# Patient Record
Sex: MALE | Race: WHITE | NOT HISPANIC OR LATINO | Employment: OTHER | URBAN - METROPOLITAN AREA
[De-identification: names, ages, dates, MRNs, and addresses within clinical notes are randomized per-mention and may not be internally consistent; named-entity substitution may affect disease eponyms.]

---

## 2019-11-27 ENCOUNTER — HOSPITAL ENCOUNTER (EMERGENCY)
Facility: CLINIC | Age: 39
Discharge: HOME | End: 2019-11-27
Attending: EMERGENCY MEDICINE | Admitting: EMERGENCY MEDICINE
Payer: COMMERCIAL

## 2019-11-27 VITALS
OXYGEN SATURATION: 96 % | TEMPERATURE: 98 F | BODY MASS INDEX: 31.5 KG/M2 | RESPIRATION RATE: 16 BRPM | WEIGHT: 220 LBS | DIASTOLIC BLOOD PRESSURE: 87 MMHG | SYSTOLIC BLOOD PRESSURE: 134 MMHG | HEART RATE: 102 BPM | HEIGHT: 70 IN

## 2019-11-27 DIAGNOSIS — J02.0 STREPTOCOCCAL PHARYNGITIS: ICD-10-CM

## 2019-11-27 LAB
DEPRECATED S PYO AG THROAT QL EIA: ABNORMAL
SPECIMEN SOURCE: ABNORMAL

## 2019-11-27 PROCEDURE — 25000132 ZZH RX MED GY IP 250 OP 250 PS 637: Performed by: EMERGENCY MEDICINE

## 2019-11-27 PROCEDURE — 25000125 ZZHC RX 250: Performed by: EMERGENCY MEDICINE

## 2019-11-27 PROCEDURE — 87880 STREP A ASSAY W/OPTIC: CPT | Performed by: EMERGENCY MEDICINE

## 2019-11-27 PROCEDURE — 25000128 H RX IP 250 OP 636: Performed by: EMERGENCY MEDICINE

## 2019-11-27 PROCEDURE — 99284 EMERGENCY DEPT VISIT MOD MDM: CPT

## 2019-11-27 RX ORDER — DEXAMETHASONE SODIUM PHOSPHATE 4 MG/ML
10 VIAL (ML) INJECTION ONCE
Status: COMPLETED | OUTPATIENT
Start: 2019-11-27 | End: 2019-11-27

## 2019-11-27 RX ORDER — ACETAMINOPHEN 325 MG/1
650 TABLET ORAL ONCE
Status: COMPLETED | OUTPATIENT
Start: 2019-11-27 | End: 2019-11-27

## 2019-11-27 RX ADMIN — PENICILLIN G BENZATHINE 1.2 MILLION UNITS: 1200000 INJECTION, SUSPENSION INTRAMUSCULAR at 20:49

## 2019-11-27 RX ADMIN — DEXAMETHASONE SODIUM PHOSPHATE 10 MG: 4 INJECTION, SOLUTION INTRAMUSCULAR; INTRAVENOUS at 19:55

## 2019-11-27 RX ADMIN — ACETAMINOPHEN 650 MG: 325 TABLET, FILM COATED ORAL at 19:56

## 2019-11-27 ASSESSMENT — ENCOUNTER SYMPTOMS
CHILLS: 1
FEVER: 1
MYALGIAS: 1
TROUBLE SWALLOWING: 1
DIAPHORESIS: 1
SORE THROAT: 1
COUGH: 0
VOMITING: 0
DIARRHEA: 1

## 2019-11-27 ASSESSMENT — MIFFLIN-ST. JEOR: SCORE: 1919.16

## 2019-11-27 NOTE — ED AVS SNAPSHOT
[facility] Emergency Department  [address]  [facility] [address]  Phone:  [phone]  Fax:  [phone]                                    Gualberto Lehman   MRN: [id number]    Department:  [facility] Emergency Department   Date of Visit:  11/27/2019           After Visit Summary Signature Page    I have received my discharge instructions, and my questions have been answered. I have discussed any challenges I see with this plan with the nurse or doctor.    ..........................................................................................................................................  Patient/Patient Representative Signature      ..........................................................................................................................................  Patient Representative Print Name and Relationship to Patient    ..................................................               ................................................  Date                                   Time    ..........................................................................................................................................  Reviewed by Signature/Title    ...................................................              ..............................................  Date                                               Time          22EPIC Rev 08/18

## 2019-11-28 NOTE — ED PROVIDER NOTES
"  History     Chief Complaint:  Pharyngitis    The history is provided by the patient.      Gualberto Lehman is a 39 year old male, with history of thyroid disease, who presents alone for evaluation of a sore throat and associated fever as high as 102F, chills and diaphoresis for the last two days. Patient has been taking acetaminophen and ibuprofen for his symptoms, last dose at 1600 today. However, per patient, wife noticed white spots to the right side of his throat, prompting him to present.     Here, patient reports he returned from [place] three days ago and initially thought his symptoms were due to exhaustion. Patient also endorses some diarrhea, generalized malaise, right ear pain and pain with swallowing secondary to the pain, but denies any cough or vomiting. He also states he has been taking Thera-Flu as well.     Allergies:  No Known Drug Allergies      Medications:    Synthroid    Past Medical History:    Vitiligo  Hypothyroidism    Past Surgical History:    Excision of tonsil tags  Tonsillectomy    Family History:    Mother - asthma  Brother - asthma    Social History:  The patient was accompanied to the ED alone.  Smoking Status: No  Alcohol Use: Yes  Drug Use: No   Marital Status:       Review of Systems   Constitutional: Positive for chills, diaphoresis and fever.   HENT: Positive for ear pain, sore throat and trouble swallowing.    Respiratory: Negative for cough.    Gastrointestinal: Positive for diarrhea. Negative for vomiting.   Musculoskeletal: Positive for myalgias (generalized).   All other systems reviewed and are negative.    Physical Exam     Patient Vitals for the past 24 hrs:   BP Temp Temp src Pulse Heart Rate Resp SpO2 Height Weight   11/27/19 1909 (!) 155/99 98  F (36.7  C) Oral 110 110 18 99 % 1.778 m (5' 10\") 99.8 kg (220 lb)       Physical Exam  VS: Reviewed per above  HENT: Mucous membranes moist. Uvula midline, no tonsillar hypertrophy nor asymmetry.  There are a " few posterior oropharyngeal exudates.  Tolerating secretions, normal phonation. No nuchal rigidity.  EYES: sclera anicteric  CV: Rate as noted, regular rhythm.   RESP: Effort normal. Breath sounds are normal bilaterally.  GI: no tenderness/rebound/guarding, not distended.  NEURO: Alert, moving all extremities  MSK: No deformity of the extremities  SKIN: Warm and dry    Emergency Department Course     Laboratory:  Laboratory findings were communicated with the patient who voiced understanding of the findings.    Rapid Strep Test: Positive (A)    Interventions:  1955 Decadron 10 mg PO  1956 Tylenol 650 mg PO  2049 Bicillin 1.2 million units IM    Emergency Department Course:  Past medical records, nursing notes, and vitals reviewed.    (1944)   I performed an exam of the patient as documented above. History obtained from patient.     A throat swab was obtained for laboratory testing, findings above.      (2023)   I rechecked the patient and discussed the results of his workup thus far. Plan of care discussed and patient will be discharged.     Findings and plan explained to the Patient. Patient discharged home with instructions regarding supportive care, medications, and reasons to return. The importance of close follow-up was reviewed. I personally reviewed the laboratory results with the Patient and answered all related questions prior to discharge.     Impression & Plan     Medical Decision Making:    Gualberto Lehman is a 39 year old male, with history of thyroid disease, who presents today for a sore throat with associated fever, chills and generalized malaise. Details as noted in the HPI.  On arrival he is afebrile.  Due to continued symptoms, despite acetaminophen and ibuprofen, he was prompted to present. He also reports that his wife noticed white spots at that back of his throat on the right side. There is clinical evidence of pharyngitis.  The rapid strep test is positive. There is no clinical evidence  of meningitis, peritonsillar abscess, retropharyngeal abscess, epiglottis, or Vinayak's angina. The patient's symptoms are consistent with streptococcal pharyngitis.  I have recommended treatment with antibiotics and patient received oral Decadron and IM Bicillin here in the ED. Discussed treatment with analgesics as well.  The patient was told to return if increasing pain, change in voice, neck pain, vomiting, fever, or shortness of breath. Follow-up with primary physician if not improving in 3-5 days. All questions were answered prior to the patient's discharge. He was in agreement with the plan stated above.       Discharge Diagnosis:    ICD-10-CM    1. Streptococcal pharyngitis J02.0        Disposition:  Discharged to home.    Scribe Disclosure:  I, Akua Reed, am serving as a scribe at 7:29 PM on 11/27/2019 to document services personally performed by Bao Santiago MD based on my observations and the provider's statements to me.    11/27/2019   [facility] EMERGENCY DEPARTMENT       Bao Santiago MD  11/27/19 9999

## 2019-11-28 NOTE — ED TRIAGE NOTES
Here for sore throat, fever, and chills for last couple days. Per patient per spouse, white spots to right side of throat. Painful to swallow. Taking aspirin and ibuprofen, last ibuprofen at 4pm. ABCs intact.

## 2022-08-02 ENCOUNTER — APPOINTMENT (OUTPATIENT)
Dept: CT IMAGING | Facility: CLINIC | Age: 42
End: 2022-08-02
Attending: EMERGENCY MEDICINE

## 2022-08-02 ENCOUNTER — HOSPITAL ENCOUNTER (EMERGENCY)
Facility: CLINIC | Age: 42
Discharge: HOME | End: 2022-08-02
Attending: EMERGENCY MEDICINE | Admitting: EMERGENCY MEDICINE

## 2022-08-02 VITALS
WEIGHT: 200 LBS | OXYGEN SATURATION: 97 % | DIASTOLIC BLOOD PRESSURE: 92 MMHG | BODY MASS INDEX: 28.63 KG/M2 | RESPIRATION RATE: 16 BRPM | HEIGHT: 70 IN | SYSTOLIC BLOOD PRESSURE: 130 MMHG | TEMPERATURE: 98 F | HEART RATE: 98 BPM

## 2022-08-02 DIAGNOSIS — K52.9 GASTROENTERITIS: ICD-10-CM

## 2022-08-02 LAB
ALBUMIN SERPL-MCNC: 4.1 G/DL (ref 3.5–5)
ALP SERPL-CCNC: 103 U/L (ref 45–120)
ALT SERPL W P-5'-P-CCNC: 31 U/L (ref 0–45)
ANION GAP SERPL CALCULATED.3IONS-SCNC: 14 MMOL/L (ref 5–18)
AST SERPL W P-5'-P-CCNC: 32 U/L (ref 0–40)
BILIRUB DIRECT SERPL-MCNC: 0.1 MG/DL
BILIRUB SERPL-MCNC: 0.5 MG/DL (ref 0–1)
BUN SERPL-MCNC: 19 MG/DL (ref 8–22)
CALCIUM SERPL-MCNC: 9.8 MG/DL (ref 8.5–10.5)
CHLORIDE BLD-SCNC: 103 MMOL/L (ref 98–107)
CO2 SERPL-SCNC: 22 MMOL/L (ref 22–31)
CREAT SERPL-MCNC: 1.21 MG/DL (ref 0.7–1.3)
ERYTHROCYTE [DISTWIDTH] IN BLOOD BY AUTOMATED COUNT: 13.2 % (ref 10–15)
GFR SERPL CREATININE-BSD FRML MDRD: 77 ML/MIN/1.73M2
GLUCOSE BLD-MCNC: 94 MG/DL (ref 70–125)
HCT VFR BLD AUTO: 46.2 % (ref 40–53)
HGB BLD-MCNC: 15.5 G/DL (ref 13.3–17.7)
LIPASE SERPL-CCNC: 32 U/L (ref 0–52)
MCH RBC QN AUTO: 29.1 PG (ref 26.5–33)
MCHC RBC AUTO-ENTMCNC: 33.5 G/DL (ref 31.5–36.5)
MCV RBC AUTO: 87 FL (ref 78–100)
PLATELET # BLD AUTO: 284 10E3/UL (ref 150–450)
POTASSIUM BLD-SCNC: 4.1 MMOL/L (ref 3.5–5)
PROT SERPL-MCNC: 7.9 G/DL (ref 6–8)
RBC # BLD AUTO: 5.33 10E6/UL (ref 4.4–5.9)
SODIUM SERPL-SCNC: 139 MMOL/L (ref 136–145)
TROPONIN I SERPL-MCNC: <0.01 NG/ML (ref 0–0.29)
WBC # BLD AUTO: 7.6 10E3/UL (ref 4–11)

## 2022-08-02 PROCEDURE — 250N000011 HC RX IP 250 OP 636: Performed by: EMERGENCY MEDICINE

## 2022-08-02 PROCEDURE — 74177 CT ABD & PELVIS W/CONTRAST: CPT

## 2022-08-02 PROCEDURE — 80053 COMPREHEN METABOLIC PANEL: CPT | Performed by: EMERGENCY MEDICINE

## 2022-08-02 PROCEDURE — 85027 COMPLETE CBC AUTOMATED: CPT | Performed by: EMERGENCY MEDICINE

## 2022-08-02 PROCEDURE — 82248 BILIRUBIN DIRECT: CPT | Performed by: EMERGENCY MEDICINE

## 2022-08-02 PROCEDURE — 99285 EMERGENCY DEPT VISIT HI MDM: CPT | Mod: 25

## 2022-08-02 PROCEDURE — 36415 COLL VENOUS BLD VENIPUNCTURE: CPT | Performed by: EMERGENCY MEDICINE

## 2022-08-02 PROCEDURE — 83690 ASSAY OF LIPASE: CPT | Performed by: EMERGENCY MEDICINE

## 2022-08-02 PROCEDURE — 84484 ASSAY OF TROPONIN QUANT: CPT | Performed by: EMERGENCY MEDICINE

## 2022-08-02 RX ORDER — DICYCLOMINE HCL 20 MG
20 TABLET ORAL 2 TIMES DAILY
Qty: 20 TABLET | Refills: 0 | Status: SHIPPED | OUTPATIENT
Start: 2022-08-02 | End: 2022-08-12

## 2022-08-02 RX ORDER — IOPAMIDOL 755 MG/ML
100 INJECTION, SOLUTION INTRAVASCULAR ONCE
Status: COMPLETED | OUTPATIENT
Start: 2022-08-02 | End: 2022-08-02

## 2022-08-02 RX ADMIN — IOPAMIDOL 100 ML: 755 INJECTION, SOLUTION INTRAVENOUS at 22:34

## 2022-08-03 NOTE — ED TRIAGE NOTES
Pt presents to the ED with c/o lower abdominal pain and cramping for the past 3-4 days along with diarrhea. Pt denies fevers.

## 2022-08-03 NOTE — ED PROVIDER NOTES
"EMERGENCY DEPARTMENT ENCOUnter      NAME: Gualberto Lehman  AGE: 41 year old male  YOB: 1980  MRN: [id number]  EVALUATION DATE & TIME: No admission date for patient encounter.    PCP: Munod Menchaca [place]    ED PROVIDER: Gómez Cordova DO      Chief Complaint   Patient presents with     Abdominal Pain     Diarrhea         FINAL IMPRESSION:  1. Gastroenteritis          ED COURSE & MEDICAL DECISION MAKIN:40 PM I met the patient and performed my initial interview and exam in waiting room.  11:44 PM Spoke about plan for discharge.         The patient presented to the emergency department today with complaints of abdominal pain and diarrhea.  His pain has been present for the past few days.  No concerning physical exam findings.  Laboratory testing is unremarkable and CT reveals evidence of likely gastroenteritis.  Plan will be for discharge home with outpatient follow-up.  He is comfortable with this plan.      At the conclusion of the encounter I discussed the results of all of the tests and the disposition. The questions were answered. The patient or family acknowledged understanding and was agreeable with the care plan.         MEDICATIONS GIVEN IN THE EMERGENCY:  Medications   iopamidol (ISOVUE-370) solution 100 mL (100 mLs Intravenous Given 22)       NEW PRESCRIPTIONS STARTED AT TODAY'S ER VISIT  New Prescriptions    DICYCLOMINE (BENTYL) 20 MG TABLET    Take 1 tablet (20 mg) by mouth 2 times daily for 10 days          =================================================================    HPI        Gualberto Lehman is a 41 year old male with a pertinent history of IBS who presents to this ED by walk in for evaluation of abdominal pain and diarrhea.    Patient reports that three days ago after eating he had lower abdominal pain with diarrhea. He has continued to have this on and off for the past two nights that is painful near his waistline that feels like \"wringing pain\". " "When he pushes on his abdomen he has discomfort. Has tried ammonium, tums, and pepcid without relief. He denies any other complaints at this time.       REVIEW OF SYSTEMS     Constitutional:  Denies fever or chills  HENT:  Denies sore throat   Respiratory:  Denies cough or shortness of breath   Cardiovascular:  Denies chest pain or palpitations  GI: Positive for abdominal pain and diarrhea. Denies nausea, or vomiting  Musculoskeletal:  Denies any new extremity pain   Skin:  Denies rash   Neurologic:  Denies headache, focal weakness or sensory changes    All other systems reviewed and are negative      PAST MEDICAL HISTORY:  Past Medical History:   Diagnosis Date     Family history of asthma     only as infant personally     Vitiligo     hands and genitals  since age 18       PAST SURGICAL HISTORY:  Past Surgical History:   Procedure Laterality Date     [facility] EXCISION OF TONSIL TAGS      age 13           CURRENT MEDICATIONS:    dicyclomine (BENTYL) 20 MG tablet  NO ACTIVE MEDICATIONS        ALLERGIES:  Allergies   Allergen Reactions     No Known Allergies        FAMILY HISTORY:  Family History   Problem Relation Age of Onset     Respiratory Mother         asthma     Respiratory Brother         asthma       SOCIAL HISTORY:   Social History     Socioeconomic History     Marital status: Single     Spouse name: None     Number of children: None     Years of education: None     Highest education level: None   Tobacco Use     Smoking status: Never Smoker     Smokeless tobacco: Never Used   Substance and Sexual Activity     Alcohol use: Yes     Comment: sometimes     Drug use: No       VITALS:  Patient Vitals for the past 24 hrs:   BP Temp Temp src Pulse Resp SpO2 Height Weight   08/02/22 2018 (!) 150/98 98  F (36.7  C) Temporal 98 18 97 % 1.778 m (5' 10\") 90.7 kg (200 lb)       PHYSICAL EXAM    VITAL SIGNS: BP (!) 150/98   Pulse 98   Temp 98  F (36.7  C) (Temporal)   Resp 18   Ht 1.778 m (5' 10\")   Wt 90.7 kg (200 lb)  "  SpO2 97%   BMI 28.70 kg/m     Constitutional:  Well developed, Well nourished,  HENT:  Normocephalic, Atraumatic, Oropharynx moist, Nose normal.   Neck:  Normal range of motion, Supple, No stridor.   Eyes:  EOMI, Conjunctiva normal, No discharge.   Respiratory:  Breathing comfortably, No respiratory distress,    Cardiovascular:  Normal pulses   Musculoskeletal:  No edema or cyanosis, no deformities  Integument:  Dry, No erythema, No rash.  Neurologic:  Alert & oriented x 3, No obvious focal deficits noted.   Psychiatric:  Affect normal, Judgment normal, Mood normal.     LAB:  All pertinent labs reviewed and interpreted.  Results for orders placed or performed during the hospital encounter of 08/02/22              CBC with platelets   Result Value Ref Range    WBC Count 7.6 4.0 - 11.0 10e3/uL    RBC Count 5.33 4.40 - 5.90 10e6/uL    Hemoglobin 15.5 13.3 - 17.7 g/dL    Hematocrit 46.2 40.0 - 53.0 %    MCV 87 78 - 100 fL    MCH 29.1 26.5 - 33.0 pg    MCHC 33.5 31.5 - 36.5 g/dL    RDW 13.2 10.0 - 15.0 %    Platelet Count 284 150 - 450 10e3/uL   Basic metabolic panel   Result Value Ref Range    Sodium 139 136 - 145 mmol/L    Potassium 4.1 3.5 - 5.0 mmol/L    Chloride 103 98 - 107 mmol/L    Carbon Dioxide (CO2) 22 22 - 31 mmol/L    Anion Gap 14 5 - 18 mmol/L    Urea Nitrogen 19 8 - 22 mg/dL    Creatinine 1.21 0.70 - 1.30 mg/dL    Calcium 9.8 8.5 - 10.5 mg/dL    Glucose 94 70 - 125 mg/dL    GFR Estimate 77 >60 mL/min/1.73m2   Hepatic function panel   Result Value Ref Range    Bilirubin Total 0.5 0.0 - 1.0 mg/dL    Bilirubin Direct 0.1 <=0.5 mg/dL    Protein Total 7.9 6.0 - 8.0 g/dL    Albumin 4.1 3.5 - 5.0 g/dL    Alkaline Phosphatase 103 45 - 120 U/L    AST 32 0 - 40 U/L    ALT 31 0 - 45 U/L   Result Value Ref Range    Lipase 32 0 - 52 U/L   Result Value Ref Range    Troponin I <0.01 0.00 - 0.29 ng/mL       RADIOLOGY:  I have independently reviewed and interpreted the above imaging, pending the final radiology  read.  CT Abdomen Pelvis w Contrast   Final Result   IMPRESSION:    1.  Fluid content throughout the large and small bowel likely reflecting a gastroenteritis or enterocolitis.      2.  2.4 cm enhancing right upper pole renal mass, highly concerning for renal cell carcinoma. Recommend follow-up with urology.          I, Drew Palma, am serving as a scribe to document services personally performed by Dr. Cordova based on my observation and the provider's statements to me. I, Gómez Cordova, DO attest that Drew Palma is acting in a scribe capacity, has observed my performance of the services and has documented them in accordance with my direction.    Gómez Cordova, DO  Emergency Medicine  [facility] EMERGENCY ROOM  [address] [facility] [address] [phone]  Dept: [phone]     Gómez Cordova MD  12/10/22 0202

## 2022-08-03 NOTE — DISCHARGE INSTRUCTIONS
Your CAT scan today shows signs of gastroenteritis.  Take the prescribed medication as directed and follow-up closely with your primary care doctor as an outpatient.  Return to the ER for any worsening symptoms or other concerns.

## 2022-08-20 ENCOUNTER — HEALTH MAINTENANCE LETTER (OUTPATIENT)
Age: 42
End: 2022-08-20

## 2022-08-25 ENCOUNTER — TRANSFERRED RECORDS (OUTPATIENT)
Dept: HEALTH INFORMATION MANAGEMENT | Facility: CLINIC | Age: 42
End: 2022-08-25

## 2022-08-26 ENCOUNTER — HOSPITAL ENCOUNTER (OUTPATIENT)
Dept: ULTRASOUND IMAGING | Facility: CLINIC | Age: 42
Discharge: HOME | End: 2022-08-26
Attending: UROLOGY

## 2022-08-26 ENCOUNTER — HOSPITAL ENCOUNTER (OUTPATIENT)
Dept: RADIOLOGY | Facility: CLINIC | Age: 42
Discharge: HOME | End: 2022-08-26
Attending: UROLOGY

## 2022-08-26 DIAGNOSIS — N28.89 OTHER SPECIFIED DISORDERS OF KIDNEY AND URETER: ICD-10-CM

## 2022-08-26 PROCEDURE — 76770 US EXAM ABDO BACK WALL COMP: CPT

## 2022-08-26 PROCEDURE — 71046 X-RAY EXAM CHEST 2 VIEWS: CPT

## 2022-09-20 NOTE — TELEPHONE ENCOUNTER
Action 2022 JTV 9:27 AM    Action Taken [facility] called patient. Patient confirmed that he was seen at [facility]. Patient confirmed the images that were done were the ones in August. Patient gave VB OK to update medical records.      [facility] sent a request to [facility] for records.      MEDICAL RECORDS REQUEST   [facility]  Urology Clinic  [address] [place] [address]  PHONE: [phone]  Fax: [phone]        FUTURE VISIT INFORMATION                                                   Gualberto Lehman, : 1980 scheduled for future visit at [facility]    APPOINTMENT INFORMATION:    Date: 10/18/2022    Provider:  Carlos Mcdonnell MD    Reason for Visit/Diagnosis: Kidney tumor 2nd opinion    RECORDS REQUESTED FOR VISIT                                                     NOTES  STATUS/DETAILS   OFFICE NOTE from other specialist Media Tab Yes, 2022 -- Ivan Guthrie MD -- [facility]   MEDICATION LIST  yes   KIDNEY CANCER     CHEST XRAY (CXR)  yes, 2022   US RENAL  yes, 2022   IMAGING (IMAGES & REPORT)  yes, 2022 -- CT ABD PELVIS     PRE-VISIT CHECKLIST      Record collection complete yes   Appointment appropriately scheduled           (right time/right provider) Yes   Joint diagnostic appointment coordinated correctly          (ensure right order & amount of time) Yes   MyChart activation Yes   Questionnaire complete If no, please explain pending

## 2022-09-30 ENCOUNTER — PRE VISIT (OUTPATIENT)
Dept: UROLOGY | Facility: CLINIC | Age: 42
End: 2022-09-30

## 2022-09-30 NOTE — TELEPHONE ENCOUNTER
Reason for visit: Consult      Relevant information: Renal mass; hx of testicular cancer    Records/imaging/labs/orders: Outside records from [facility] available  - CT, CXR, US all available - images not resolved in PACS but available to view on Nilread    Pt called: N/A    At Rooming: Standard

## 2022-10-18 ENCOUNTER — ALLIED HEALTH/NURSE VISIT (OUTPATIENT)
Dept: UROLOGY | Facility: CLINIC | Age: 42
End: 2022-10-18

## 2022-10-18 ENCOUNTER — OFFICE VISIT (OUTPATIENT)
Dept: UROLOGY | Facility: CLINIC | Age: 42
End: 2022-10-18

## 2022-10-18 ENCOUNTER — PRE VISIT (OUTPATIENT)
Dept: UROLOGY | Facility: CLINIC | Age: 42
End: 2022-10-18

## 2022-10-18 VITALS
WEIGHT: 220 LBS | HEIGHT: 70 IN | HEART RATE: 93 BPM | DIASTOLIC BLOOD PRESSURE: 86 MMHG | BODY MASS INDEX: 31.5 KG/M2 | SYSTOLIC BLOOD PRESSURE: 123 MMHG

## 2022-10-18 DIAGNOSIS — N28.89 RIGHT RENAL MASS: Primary | ICD-10-CM

## 2022-10-18 PROBLEM — L80 VITILIGO: Status: ACTIVE | Noted: 2018-01-09

## 2022-10-18 PROBLEM — E78.5 HYPERLIPIDEMIA: Status: ACTIVE | Noted: 2022-01-25

## 2022-10-18 PROBLEM — J45.20 MILD INTERMITTENT ASTHMA: Status: ACTIVE | Noted: 2018-01-09

## 2022-10-18 PROCEDURE — 99204 OFFICE O/P NEW MOD 45 MIN: CPT | Performed by: UROLOGY

## 2022-10-18 PROCEDURE — 99207 PR NO CHARGE LOS: CPT

## 2022-10-18 RX ORDER — CEFAZOLIN SODIUM 2 G/50ML
2 SOLUTION INTRAVENOUS
Status: CANCELLED | OUTPATIENT
Start: 2022-10-18

## 2022-10-18 RX ORDER — LEVOTHYROXINE SODIUM 88 UG/1
112 TABLET ORAL EVERY EVENING
COMMUNITY

## 2022-10-18 RX ORDER — CEFAZOLIN SODIUM 2 G/50ML
2 SOLUTION INTRAVENOUS SEE ADMIN INSTRUCTIONS
Status: CANCELLED | OUTPATIENT
Start: 2022-10-18

## 2022-10-18 NOTE — LETTER
10/18/2022       RE: Gualberto Lehman  [address]  [place] [facility] [place] [address]     Dear Colleague,    Thank you for referring your patient, Gualberto Lehman, to the [facility] [place] [facility]. Please see a copy of my visit note below.          Chief Complaint:   Right renal mass         History of Present Illness:    Gualberto Lehman is a very pleasant 42 year old male who presents with a history of right renal mass. This was noted incidentally. His previous urologic history is significant for testis cancer, with orchiectomy at [facility].  Has also had hematuria in the past, and had cystoscopy. He recently went to ED with abdominal pain, and noted to have endophytic 2.4 cm right renal mass, suspicious for malignancy. Has previously been counseled about radical nephrectomy, and presents for second opinion.    Creat 1.21    CT abd/pelvis 8/2/2022  IMPRESSION:   1.  Fluid content throughout the large and small bowel likely reflecting a gastroenteritis or enterocolitis.     2.  2.4 cm enhancing right upper pole renal mass, highly concerning for renal cell carcinoma. Recommend follow-up with urology.    Testes, right radical orchiectomy:   --- Seminoma, classic type.   --- Uninvolved testis with predominantly fibrotic   seminiferous tubules with occasional seminiferous tubules   with Sertoli cells.   --- Surgical margins are uninvolved by tumor.   MCSS-I Case   Pathologic stage:  pT1a pNX          Past Medical History:     Past Medical History:   Diagnosis Date     Family history of asthma     only as infant personally     Vitiligo     hands and genitals  since age 18   Hashimoto's   Testis cancer s/p right orchiectomy         Past Surgical History:     Past Surgical History:   Procedure Laterality Date     [facility] EXCISION OF TONSIL TAGS      age 13   Right orchiectomy         Medications     Current Outpatient Medications  "  Medication     levothyroxine (SYNTHROID/LEVOTHROID) 88 MCG tablet     NO ACTIVE MEDICATIONS     No current facility-administered medications for this visit.          Family History:     Family History   Problem Relation Age of Onset     Respiratory Mother         asthma     Respiratory Brother         asthma            Social History:     Social History     Socioeconomic History     Marital status: Single     Spouse name: Not on file     Number of children: Not on file     Years of education: Not on file     Highest education level: Not on file   Occupational History     Not on file   Tobacco Use     Smoking status: Never     Smokeless tobacco: Never   Substance and Sexual Activity     Alcohol use: Yes     Comment: sometimes     Drug use: No     Sexual activity: Not on file   Other Topics Concern     Not on file   Social History Narrative     Not on file     Social Determinants of Health     Financial Resource Strain: Not on file   Food Insecurity: Not on file   Transportation Needs: Not on file   Physical Activity: Not on file   Stress: Not on file   Social Connections: Not on file   Intimate Partner Violence: Not on file   Housing Stability: Not on file            Allergies:   No known allergies         Review of Systems:  From intake questionnaire     Skin: negative  Eyes: negative  Ears/Nose/Throat: negative  Respiratory: No shortness of breath, dyspnea on exertion, cough, or hemoptysis  Cardiovascular: No chest pain or palpitations  Gastrointestinal: negative; no nausea/vomiting, constipation or diarrhea  Genitourinary: as per HPI  Musculoskeletal: negative  Neurologic: negative  Psychiatric: negative  Hematologic/Lymphatic/Immunologic: negative  Endocrine: negative         Physical Exam:     Patient is a 42 year old  male   Vitals: Blood pressure 123/86, pulse 93, height 1.778 m (5' 10\"), weight 99.8 kg (220 lb).  Constitutional: Body mass index is 31.57 kg/m .  Alert, no acute distress, oriented, " conversant  Eyes: no scleral icterus; extraocular muscles intact, moist conjunctivae  Respiratory: no respiratory distress, or pursed lip breathing  Cardiovascular: pulses strong and intact; no obvious jugular venous distension present  Gastrointestinal: soft, nontender, no organomegaly or masses,   Musculoskeletal: extremities normal, no peripheral edema  Skin: no suspicious lesions or rashes  Neuro: Alert, oriented, speech and mentation normal  Psych: affect and mood normal, alert and oriented to person, place and time      Labs and Pathology:    I reviewed all applicable laboratory and pathology data and went over findings with patient  Significant for   Lab Results   Component Value Date    CR 1.21 08/02/2022       Imaging:    The following imaging exams were independently viewed and interpreted by me and discussed with patient:  CT reviewed - 2.4 cm right renal mass, suspicious for malignancy      Outside and Past Medical records:    Review of prior external note(s) from - Outside records from [facility]  Review of the result(s) of each unique test - CT, creat         Assessment and Plan:     Assessment: 42 year old male with new diagnosis of 2.4 cm right renal mass. We had an extensive discussion about the significance of a localized, enhancing kidney mass.  We reviewed that this is a previously undiagnosed new problem with uncertain prognosis. We discussed that approximately 80% of enhancing renal masses turn out to be kidney cancer upon removal, while 20% are found to be benign.  Although certain features such as size, gender and tumor characteristics can change these risks.    We discussed the role of renal mass biopsy including the fact that renal mass biopsy is safe with current techniques, it can be inaccurate and it can be non-diagnostic.  Furthermore, the majority of patients find they ultimately need to proceed with treatment anyway.      We discussed the options for treatment of a localized kidney  mass including active surveillance, thermal ablation, and surgical extirpation.  Surgical removal has the best track record and close to a 99% chance of cure for T1a lesions compared to 90% cure with thermal ablation and is generally preferred.  Furthermore, thermal ablation is not optimal for larger masses or centrally located masses.      We discussed the role of observation and the low risk of metastases associated with lesions less than 2-3 cm in size and the potential for slow/stable growth in many cases.  However, the unpredictable natural history of these lesions was mentioned as a drawback with this approach and the lack of effective therapy in the event of systemic progression.  In general, this approach is most favored for those with limited life expectancy and/or those with indeterminate (< 2-3 cm) renal masses.    Furthermore, we discussed the relative merits of partial nephrectomy vs. radical nephrectomy and the theoretic basis behind maximal nephron preservation.  There is some data suggesting there are long term survival advantages and equivalent cancer control with nephron sparing surgery.  We also discussed the advantages and disadvantages and roles of open surgery vs. laparoscopic (and Da William assisted) surgery. Risks and benefits of robotic partial nephrectomy were discussed in detail. Risks of surgery including bleeding, infection, MI, stroke, DVT/PE, bowel injury and injury to other surrounding structures were discussed. In addition, we discussed potential for positive surgical margins, urine leak, vascular injury, and potential need for radical nephrectomy or conversion to an open procedure.      The anticipated post-operative course was explained as well as expectations for recovery. All questions were answered.  A written informed consent will be finalized on the morning of the procedure.    Plan:  - Schedule right robotic partial nephrectomy    Orders  Orders Placed This Encounter    Procedures     Case Request: NEPHRECTOMY, PARTIAL, ROBOT-ASSISTED, LAPAROSCOPIC; POSSIBLE RADICAL NEPHRECTOMY     Carlos Mcdonnell MD  Urology  [facility]

## 2022-10-18 NOTE — PROGRESS NOTES
Chief Complaint:   Right renal mass         History of Present Illness:    Gualberto Lehman is a very pleasant 42 year old male who presents with a history of right renal mass. This was noted incidentally. His previous urologic history is significant for testis cancer, with orchiectomy at [facility].  Has also had hematuria in the past, and had cystoscopy. He recently went to ED with abdominal pain, and noted to have endophytic 2.4 cm right renal mass, suspicious for malignancy. Has previously been counseled about radical nephrectomy, and presents for second opinion.    Creat 1.21    CT abd/pelvis 8/2/2022  IMPRESSION:   1.  Fluid content throughout the large and small bowel likely reflecting a gastroenteritis or enterocolitis.     2.  2.4 cm enhancing right upper pole renal mass, highly concerning for renal cell carcinoma. Recommend follow-up with urology.    Testes, right radical orchiectomy:   --- Seminoma, classic type.   --- Uninvolved testis with predominantly fibrotic   seminiferous tubules with occasional seminiferous tubules   with Sertoli cells.   --- Surgical margins are uninvolved by tumor.   MCSS-I Case   Pathologic stage:  pT1a pNX          Past Medical History:     Past Medical History:   Diagnosis Date     Family history of asthma     only as infant personally     Vitiligo     hands and genitals  since age 18   Hashimoto's   Testis cancer s/p right orchiectomy         Past Surgical History:     Past Surgical History:   Procedure Laterality Date     [facility] EXCISION OF TONSIL TAGS      age 13   Right orchiectomy         Medications     Current Outpatient Medications   Medication     levothyroxine (SYNTHROID/LEVOTHROID) 88 MCG tablet     NO ACTIVE MEDICATIONS     No current facility-administered medications for this visit.          Family History:     Family History   Problem Relation Age of Onset     Respiratory Mother         asthma     Respiratory Brother         asthma            Social History:  "    Social History     Socioeconomic History     Marital status: Single     Spouse name: Not on file     Number of children: Not on file     Years of education: Not on file     Highest education level: Not on file   Occupational History     Not on file   Tobacco Use     Smoking status: Never     Smokeless tobacco: Never   Substance and Sexual Activity     Alcohol use: Yes     Comment: sometimes     Drug use: No     Sexual activity: Not on file   Other Topics Concern     Not on file   Social History Narrative     Not on file     Social Determinants of Health     Financial Resource Strain: Not on file   Food Insecurity: Not on file   Transportation Needs: Not on file   Physical Activity: Not on file   Stress: Not on file   Social Connections: Not on file   Intimate Partner Violence: Not on file   Housing Stability: Not on file            Allergies:   No known allergies         Review of Systems:  From intake questionnaire     Skin: negative  Eyes: negative  Ears/Nose/Throat: negative  Respiratory: No shortness of breath, dyspnea on exertion, cough, or hemoptysis  Cardiovascular: No chest pain or palpitations  Gastrointestinal: negative; no nausea/vomiting, constipation or diarrhea  Genitourinary: as per HPI  Musculoskeletal: negative  Neurologic: negative  Psychiatric: negative  Hematologic/Lymphatic/Immunologic: negative  Endocrine: negative         Physical Exam:     Patient is a 42 year old  male   Vitals: Blood pressure 123/86, pulse 93, height 1.778 m (5' 10\"), weight 99.8 kg (220 lb).  Constitutional: Body mass index is 31.57 kg/m .  Alert, no acute distress, oriented, conversant  Eyes: no scleral icterus; extraocular muscles intact, moist conjunctivae  Respiratory: no respiratory distress, or pursed lip breathing  Cardiovascular: pulses strong and intact; no obvious jugular venous distension present  Gastrointestinal: soft, nontender, no organomegaly or masses,   Musculoskeletal: extremities normal, no " peripheral edema  Skin: no suspicious lesions or rashes  Neuro: Alert, oriented, speech and mentation normal  Psych: affect and mood normal, alert and oriented to person, place and time      Labs and Pathology:    I reviewed all applicable laboratory and pathology data and went over findings with patient  Significant for   Lab Results   Component Value Date    CR 1.21 08/02/2022       Imaging:    The following imaging exams were independently viewed and interpreted by me and discussed with patient:  CT reviewed - 2.4 cm right renal mass, suspicious for malignancy      Outside and Past Medical records:    Review of prior external note(s) from - Outside records from [facility]  Review of the result(s) of each unique test - CT, creat         Assessment and Plan:     Assessment: 42 year old male with new diagnosis of 2.4 cm right renal mass. We had an extensive discussion about the significance of a localized, enhancing kidney mass.  We reviewed that this is a previously undiagnosed new problem with uncertain prognosis. We discussed that approximately 80% of enhancing renal masses turn out to be kidney cancer upon removal, while 20% are found to be benign.  Although certain features such as size, gender and tumor characteristics can change these risks.    We discussed the role of renal mass biopsy including the fact that renal mass biopsy is safe with current techniques, it can be inaccurate and it can be non-diagnostic.  Furthermore, the majority of patients find they ultimately need to proceed with treatment anyway.      We discussed the options for treatment of a localized kidney mass including active surveillance, thermal ablation, and surgical extirpation.  Surgical removal has the best track record and close to a 99% chance of cure for T1a lesions compared to 90% cure with thermal ablation and is generally preferred.  Furthermore, thermal ablation is not optimal for larger masses or centrally located masses.       We discussed the role of observation and the low risk of metastases associated with lesions less than 2-3 cm in size and the potential for slow/stable growth in many cases.  However, the unpredictable natural history of these lesions was mentioned as a drawback with this approach and the lack of effective therapy in the event of systemic progression.  In general, this approach is most favored for those with limited life expectancy and/or those with indeterminate (< 2-3 cm) renal masses.    Furthermore, we discussed the relative merits of partial nephrectomy vs. radical nephrectomy and the theoretic basis behind maximal nephron preservation.  There is some data suggesting there are long term survival advantages and equivalent cancer control with nephron sparing surgery.  We also discussed the advantages and disadvantages and roles of open surgery vs. laparoscopic (and Da William assisted) surgery. Risks and benefits of robotic partial nephrectomy were discussed in detail. Risks of surgery including bleeding, infection, MI, stroke, DVT/PE, bowel injury and injury to other surrounding structures were discussed. In addition, we discussed potential for positive surgical margins, urine leak, vascular injury, and potential need for radical nephrectomy or conversion to an open procedure.      The anticipated post-operative course was explained as well as expectations for recovery. All questions were answered.  A written informed consent will be finalized on the morning of the procedure.    Plan:  - Schedule right robotic partial nephrectomy    Orders  Orders Placed This Encounter   Procedures     Case Request: NEPHRECTOMY, PARTIAL, ROBOT-ASSISTED, LAPAROSCOPIC; POSSIBLE RADICAL NEPHRECTOMY     Carlos Mcdonnell MD  Urology  [facility]

## 2022-10-18 NOTE — PROGRESS NOTES
Pre Op Teaching Flowsheet       Pre and Post op Patient Education  Relevant Diagnosis:  Right renal mass  Surgical procedure:  Right partial nephrectomy  Teaching Topic:  Pre and post op teaching  Person Involved in teaching: Yes    Motivation Level:  Asks Questions: Yes  Eager to Learn: Yes  Cooperative: Yes  Receptive (willing/able to accept information):  Yes    Patient demonstrates understanding of the following:  Date of surgery:  Will call  Location of surgery:  [facility] - 5th Floor  History and Physical and any other testing necessary prior to surgery: Yes  Required time line for completion of History and Physical and any pre-op testing: Yes    Patient demonstrates understanding of the following:  Pre-op bowel prep:  N/A  Pre-op showering/scrub information with PCMX Soap: Yes  Blood thinner medications discussed and when to stop (if applicable):  N/A  Discussed no visitor's at this time due to increase Covid-19 cases and how we need to make sure everyone stays safe.    Infection Prevention:   Patient demonstrates understanding of the following:  Surgical procedure site care taught: Yes  Signs and symptoms of infection taught: Yes      Post-op follow-up:  Discussed how to contact the hospital, nurse, and clinic scheduling staff if necessary. (See packet information)    Instructional materials used/given/mailed:  [facility] Packet, post op teaching sheet, Map, Soap, and with the arrival/location information to come closer to the surgery date.    Surgical instructions packet given to patient in office:  N/A    Follow up: Discussed arranging for someone to drive you home. ( No public transportation)  Someone needed to stay the first twenty hours after surgery: Yes     referral: yes, financial services engaged     home:  yes    Care Giver:  yes    PCP:  no

## 2022-10-24 PROBLEM — N28.89 RIGHT RENAL MASS: Status: ACTIVE | Noted: 2022-10-24

## 2022-10-25 ENCOUNTER — TELEPHONE (OUTPATIENT)
Dept: UROLOGY | Facility: CLINIC | Age: 42
End: 2022-10-25

## 2022-10-25 NOTE — TELEPHONE ENCOUNTER
Called patient to schedule surgery with Dr. Mcdonnell.     Date of Surgery: 11/21/2022    Approximate arrival time given:   Plan to arrive mid-morning. Pre-op RN will call 2-3 days prior to surgery     Location of surgery: [place] [facility]    Pre-Op H&P: PCP - [facility], instructed patient to call.     Post-Op Appt Date: Needs 2 weeks with Dr. Mcdonnell to be scheduled. Nothing available in clinic      Imaging needed:  No     Discussed COVID-19 Testing: Yes Scheduled [facility] - order needs to be placed by RN     Patient aware that pre-op RN will call 2-3 days prior to surgery with arrival time and instructions Yes     Packet sent out: Already received per RN documentation    All patients questions were answered and was instructed to review surgical packet and call back with any questions or concerns.       Kate Lion on 10/25/2022 at 4:28 PM

## 2022-10-26 DIAGNOSIS — N28.89 RIGHT RENAL MASS: Primary | ICD-10-CM

## 2022-11-08 NOTE — TELEPHONE ENCOUNTER
Called patient regarding upcoming surgery with Dr. Mcdonnell. Pt is listed w/o insurance on file. Patient confirms that this is true. Pt states that he has applied for [facility] and waiting to hear back.   - Informed patient that per Dr. Mcdonnell, patient can be safely delayed a couple of months. Pt wishes to proceed with surgery as scheduled as he is planning on going to the [employer] in January and was hoping to be recovered prior to this. He is afraid that it would impact his opportunity to get the job.   Discussed with patient writer will reach out to the financial services and get back with him on next steps.     Kate Lion on 11/8/2022 at 8:51 AM

## 2022-11-09 ENCOUNTER — TELEPHONE (OUTPATIENT)
Dept: UROLOGY | Facility: CLINIC | Age: 42
End: 2022-11-09

## 2022-11-09 NOTE — TELEPHONE ENCOUNTER
Called patient and left VM.. Also sent My Chart message reminding patient of pre surgery requirements.  This author's direct line provided for future questions/concerns.    Franky Luciano, RN  RN Care Coordinator - Urology

## 2022-11-11 ENCOUNTER — LAB (OUTPATIENT)
Dept: LAB | Facility: CLINIC | Age: 42
End: 2022-11-11

## 2022-11-11 DIAGNOSIS — N28.89 RIGHT RENAL MASS: ICD-10-CM

## 2022-11-11 LAB
ALBUMIN UR-MCNC: NEGATIVE MG/DL
APPEARANCE UR: CLEAR
BILIRUB UR QL STRIP: NEGATIVE
COLOR UR AUTO: ABNORMAL
GLUCOSE UR STRIP-MCNC: NEGATIVE MG/DL
HGB UR QL STRIP: ABNORMAL
KETONES UR STRIP-MCNC: NEGATIVE MG/DL
LEUKOCYTE ESTERASE UR QL STRIP: NEGATIVE
NITRATE UR QL: NEGATIVE
PH UR STRIP: 6 [PH] (ref 5–7)
RBC URINE: 2 /HPF
SP GR UR STRIP: 1.01 (ref 1–1.03)
SQUAMOUS EPITHELIAL: <1 /HPF
UROBILINOGEN UR STRIP-MCNC: NORMAL MG/DL
WBC URINE: 0 /HPF

## 2022-11-11 PROCEDURE — 81001 URINALYSIS AUTO W/SCOPE: CPT | Performed by: PATHOLOGY

## 2022-11-16 RX ORDER — ALBUTEROL SULFATE 90 UG/1
AEROSOL, METERED RESPIRATORY (INHALATION)
COMMUNITY

## 2022-11-16 RX ORDER — CETIRIZINE HYDROCHLORIDE 10 MG/1
10 TABLET ORAL DAILY
COMMUNITY

## 2022-11-17 ENCOUNTER — LAB (OUTPATIENT)
Dept: LAB | Facility: CLINIC | Age: 42
End: 2022-11-17

## 2022-11-17 DIAGNOSIS — Z11.59 SPECIAL SCREENING EXAMINATION FOR VIRAL DISEASE: Primary | ICD-10-CM

## 2022-11-17 LAB — SARS-COV-2 RNA RESP QL NAA+PROBE: NEGATIVE

## 2022-11-17 PROCEDURE — U0005 INFEC AGEN DETEC AMPLI PROBE: HCPCS

## 2022-11-17 PROCEDURE — U0003 INFECTIOUS AGENT DETECTION BY NUCLEIC ACID (DNA OR RNA); SEVERE ACUTE RESPIRATORY SYNDROME CORONAVIRUS 2 (SARS-COV-2) (CORONAVIRUS DISEASE [COVID-19]), AMPLIFIED PROBE TECHNIQUE, MAKING USE OF HIGH THROUGHPUT TECHNOLOGIES AS DESCRIBED BY CMS-2020-01-R: HCPCS

## 2022-11-20 ENCOUNTER — ANESTHESIA EVENT (OUTPATIENT)
Dept: SURGERY | Facility: CLINIC | Age: 42
DRG: 661 | End: 2022-11-20

## 2022-11-20 ENCOUNTER — HEALTH MAINTENANCE LETTER (OUTPATIENT)
Age: 42
End: 2022-11-20

## 2022-11-21 ENCOUNTER — HOSPITAL ENCOUNTER (INPATIENT)
Facility: CLINIC | Age: 42
LOS: 1 days | Discharge: HOME | DRG: 661 | End: 2022-11-22
Attending: UROLOGY | Admitting: UROLOGY

## 2022-11-21 ENCOUNTER — ANESTHESIA (OUTPATIENT)
Dept: SURGERY | Facility: CLINIC | Age: 42
DRG: 661 | End: 2022-11-21

## 2022-11-21 DIAGNOSIS — G89.18 POSTOPERATIVE PAIN: Primary | ICD-10-CM

## 2022-11-21 PROBLEM — N28.89 RENAL MASS: Status: ACTIVE | Noted: 2022-11-21

## 2022-11-21 LAB
ABO/RH(D): NORMAL
ANION GAP SERPL CALCULATED.3IONS-SCNC: 16 MMOL/L (ref 7–15)
ANION GAP SERPL CALCULATED.3IONS-SCNC: 17 MMOL/L (ref 7–15)
ANTIBODY SCREEN: NEGATIVE
BUN SERPL-MCNC: 16.8 MG/DL (ref 6–20)
BUN SERPL-MCNC: 17.3 MG/DL (ref 6–20)
CALCIUM SERPL-MCNC: 9.1 MG/DL (ref 8.6–10)
CALCIUM SERPL-MCNC: 9.2 MG/DL (ref 8.6–10)
CHLORIDE SERPL-SCNC: 101 MMOL/L (ref 98–107)
CHLORIDE SERPL-SCNC: 103 MMOL/L (ref 98–107)
CREAT SERPL-MCNC: 0.94 MG/DL (ref 0.67–1.17)
CREAT SERPL-MCNC: 1.15 MG/DL (ref 0.67–1.17)
CREAT SERPL-MCNC: 1.17 MG/DL (ref 0.67–1.17)
DEPRECATED HCO3 PLAS-SCNC: 21 MMOL/L (ref 22–29)
DEPRECATED HCO3 PLAS-SCNC: 21 MMOL/L (ref 22–29)
GFR SERPL CREATININE-BSD FRML MDRD: 80 ML/MIN/1.73M2
GFR SERPL CREATININE-BSD FRML MDRD: 81 ML/MIN/1.73M2
GFR SERPL CREATININE-BSD FRML MDRD: >90 ML/MIN/1.73M2
GLUCOSE BLDC GLUCOMTR-MCNC: 101 MG/DL (ref 70–99)
GLUCOSE SERPL-MCNC: 140 MG/DL (ref 70–99)
GLUCOSE SERPL-MCNC: 178 MG/DL (ref 70–99)
HGB BLD-MCNC: 14.8 G/DL (ref 13.3–17.7)
HGB BLD-MCNC: 15.2 G/DL (ref 13.3–17.7)
POTASSIUM SERPL-SCNC: 3.9 MMOL/L (ref 3.4–5.3)
POTASSIUM SERPL-SCNC: 4.2 MMOL/L (ref 3.4–5.3)
POTASSIUM SERPL-SCNC: 4.5 MMOL/L (ref 3.4–5.3)
SODIUM SERPL-SCNC: 139 MMOL/L (ref 136–145)
SODIUM SERPL-SCNC: 140 MMOL/L (ref 136–145)
SPECIMEN EXPIRATION DATE: NORMAL

## 2022-11-21 PROCEDURE — 258N000003 HC RX IP 258 OP 636: Performed by: STUDENT IN AN ORGANIZED HEALTH CARE EDUCATION/TRAINING PROGRAM

## 2022-11-21 PROCEDURE — 88331 PATH CONSLTJ SURG 1 BLK 1SPC: CPT | Mod: 26 | Performed by: PATHOLOGY

## 2022-11-21 PROCEDURE — 80048 BASIC METABOLIC PNL TOTAL CA: CPT | Performed by: ANESTHESIOLOGY

## 2022-11-21 PROCEDURE — 84132 ASSAY OF SERUM POTASSIUM: CPT | Performed by: STUDENT IN AN ORGANIZED HEALTH CARE EDUCATION/TRAINING PROGRAM

## 2022-11-21 PROCEDURE — 8E0W4CZ ROBOTIC ASSISTED PROCEDURE OF TRUNK REGION, PERCUTANEOUS ENDOSCOPIC APPROACH: ICD-10-PCS | Performed by: UROLOGY

## 2022-11-21 PROCEDURE — 0TB04ZZ EXCISION OF RIGHT KIDNEY, PERCUTANEOUS ENDOSCOPIC APPROACH: ICD-10-PCS | Performed by: UROLOGY

## 2022-11-21 PROCEDURE — 85018 HEMOGLOBIN: CPT | Performed by: STUDENT IN AN ORGANIZED HEALTH CARE EDUCATION/TRAINING PROGRAM

## 2022-11-21 PROCEDURE — 86901 BLOOD TYPING SEROLOGIC RH(D): CPT | Performed by: UROLOGY

## 2022-11-21 PROCEDURE — 250N000025 HC SEVOFLURANE, PER MIN: Performed by: UROLOGY

## 2022-11-21 PROCEDURE — 250N000013 HC RX MED GY IP 250 OP 250 PS 637: Performed by: UROLOGY

## 2022-11-21 PROCEDURE — 250N000011 HC RX IP 250 OP 636: Performed by: UROLOGY

## 2022-11-21 PROCEDURE — 84132 ASSAY OF SERUM POTASSIUM: CPT | Performed by: ANESTHESIOLOGY

## 2022-11-21 PROCEDURE — 250N000009 HC RX 250: Performed by: ANESTHESIOLOGY

## 2022-11-21 PROCEDURE — 258N000003 HC RX IP 258 OP 636: Performed by: ANESTHESIOLOGY

## 2022-11-21 PROCEDURE — 250N000013 HC RX MED GY IP 250 OP 250 PS 637: Performed by: ANESTHESIOLOGY

## 2022-11-21 PROCEDURE — 36415 COLL VENOUS BLD VENIPUNCTURE: CPT | Performed by: STUDENT IN AN ORGANIZED HEALTH CARE EDUCATION/TRAINING PROGRAM

## 2022-11-21 PROCEDURE — 76770 US EXAM ABDO BACK WALL COMP: CPT | Mod: 26 | Performed by: UROLOGY

## 2022-11-21 PROCEDURE — 88331 PATH CONSLTJ SURG 1 BLK 1SPC: CPT | Mod: TC | Performed by: UROLOGY

## 2022-11-21 PROCEDURE — 250N000005 HC OR RX SURGIFLO HEMOSTATIC MATRIX 10ML 199102S OPNP: Performed by: UROLOGY

## 2022-11-21 PROCEDURE — 999N000141 HC STATISTIC PRE-PROCEDURE NURSING ASSESSMENT: Performed by: UROLOGY

## 2022-11-21 PROCEDURE — 250N000011 HC RX IP 250 OP 636: Performed by: STUDENT IN AN ORGANIZED HEALTH CARE EDUCATION/TRAINING PROGRAM

## 2022-11-21 PROCEDURE — 86850 RBC ANTIBODY SCREEN: CPT | Performed by: UROLOGY

## 2022-11-21 PROCEDURE — 710N000010 HC RECOVERY PHASE 1, LEVEL 2, PER MIN: Performed by: UROLOGY

## 2022-11-21 PROCEDURE — 120N000002 HC R&B MED SURG/OB UMMC

## 2022-11-21 PROCEDURE — 36415 COLL VENOUS BLD VENIPUNCTURE: CPT | Performed by: UROLOGY

## 2022-11-21 PROCEDURE — 272N000001 HC OR GENERAL SUPPLY STERILE: Performed by: UROLOGY

## 2022-11-21 PROCEDURE — 250N000011 HC RX IP 250 OP 636: Performed by: ANESTHESIOLOGY

## 2022-11-21 PROCEDURE — 88307 TISSUE EXAM BY PATHOLOGIST: CPT | Mod: 26 | Performed by: PATHOLOGY

## 2022-11-21 PROCEDURE — 370N000017 HC ANESTHESIA TECHNICAL FEE, PER MIN: Performed by: UROLOGY

## 2022-11-21 PROCEDURE — 360N000080 HC SURGERY LEVEL 7, PER MIN: Performed by: UROLOGY

## 2022-11-21 PROCEDURE — 50543 LAPARO PARTIAL NEPHRECTOMY: CPT | Mod: RT | Performed by: UROLOGY

## 2022-11-21 PROCEDURE — 250N000011 HC RX IP 250 OP 636: Performed by: REGISTERED NURSE

## 2022-11-21 PROCEDURE — 82565 ASSAY OF CREATININE: CPT | Performed by: ANESTHESIOLOGY

## 2022-11-21 RX ORDER — LEVOTHYROXINE SODIUM 112 UG/1
112 TABLET ORAL EVERY EVENING
Status: DISCONTINUED | OUTPATIENT
Start: 2022-11-21 | End: 2022-11-22 | Stop reason: HOSPADM

## 2022-11-21 RX ORDER — LIDOCAINE 40 MG/G
CREAM TOPICAL
Status: DISCONTINUED | OUTPATIENT
Start: 2022-11-21 | End: 2022-11-22 | Stop reason: HOSPADM

## 2022-11-21 RX ORDER — POLYETHYLENE GLYCOL 3350 17 G/17G
17 POWDER, FOR SOLUTION ORAL 2 TIMES DAILY
Status: DISCONTINUED | OUTPATIENT
Start: 2022-11-21 | End: 2022-11-22 | Stop reason: HOSPADM

## 2022-11-21 RX ORDER — ACETAMINOPHEN 325 MG/1
650 TABLET ORAL EVERY 6 HOURS PRN
Status: DISCONTINUED | OUTPATIENT
Start: 2022-11-21 | End: 2022-11-22 | Stop reason: DRUGHIGH

## 2022-11-21 RX ORDER — HYDROMORPHONE HYDROCHLORIDE 1 MG/ML
0.4 INJECTION, SOLUTION INTRAMUSCULAR; INTRAVENOUS; SUBCUTANEOUS EVERY 5 MIN PRN
Status: DISCONTINUED | OUTPATIENT
Start: 2022-11-21 | End: 2022-11-21 | Stop reason: HOSPADM

## 2022-11-21 RX ORDER — SENNOSIDES 8.6 MG
1 TABLET ORAL 2 TIMES DAILY PRN
Status: DISCONTINUED | OUTPATIENT
Start: 2022-11-21 | End: 2022-11-22 | Stop reason: HOSPADM

## 2022-11-21 RX ORDER — ONDANSETRON 2 MG/ML
4 INJECTION INTRAMUSCULAR; INTRAVENOUS EVERY 30 MIN PRN
Status: DISCONTINUED | OUTPATIENT
Start: 2022-11-21 | End: 2022-11-21 | Stop reason: HOSPADM

## 2022-11-21 RX ORDER — CALCIUM CARBONATE 500 MG/1
500 TABLET, CHEWABLE ORAL 4 TIMES DAILY PRN
Status: DISCONTINUED | OUTPATIENT
Start: 2022-11-21 | End: 2022-11-22 | Stop reason: HOSPADM

## 2022-11-21 RX ORDER — PROCHLORPERAZINE MALEATE 5 MG
10 TABLET ORAL EVERY 6 HOURS PRN
Status: DISCONTINUED | OUTPATIENT
Start: 2022-11-21 | End: 2022-11-22 | Stop reason: HOSPADM

## 2022-11-21 RX ORDER — SODIUM CHLORIDE, SODIUM LACTATE, POTASSIUM CHLORIDE, CALCIUM CHLORIDE 600; 310; 30; 20 MG/100ML; MG/100ML; MG/100ML; MG/100ML
INJECTION, SOLUTION INTRAVENOUS CONTINUOUS
Status: DISCONTINUED | OUTPATIENT
Start: 2022-11-21 | End: 2022-11-21

## 2022-11-21 RX ORDER — PROPOFOL 10 MG/ML
INJECTION, EMULSION INTRAVENOUS PRN
Status: DISCONTINUED | OUTPATIENT
Start: 2022-11-21 | End: 2022-11-21

## 2022-11-21 RX ORDER — LABETALOL HYDROCHLORIDE 5 MG/ML
5 INJECTION, SOLUTION INTRAVENOUS EVERY 5 MIN PRN
Status: DISCONTINUED | OUTPATIENT
Start: 2022-11-21 | End: 2022-11-21 | Stop reason: HOSPADM

## 2022-11-21 RX ORDER — NALOXONE HYDROCHLORIDE 0.4 MG/ML
0.2 INJECTION, SOLUTION INTRAMUSCULAR; INTRAVENOUS; SUBCUTANEOUS
Status: DISCONTINUED | OUTPATIENT
Start: 2022-11-21 | End: 2022-11-22 | Stop reason: HOSPADM

## 2022-11-21 RX ORDER — ONDANSETRON 4 MG/1
4 TABLET, ORALLY DISINTEGRATING ORAL EVERY 30 MIN PRN
Status: DISCONTINUED | OUTPATIENT
Start: 2022-11-21 | End: 2022-11-21 | Stop reason: HOSPADM

## 2022-11-21 RX ORDER — CETIRIZINE HYDROCHLORIDE 10 MG/1
10 TABLET ORAL DAILY
Status: DISCONTINUED | OUTPATIENT
Start: 2022-11-22 | End: 2022-11-22 | Stop reason: HOSPADM

## 2022-11-21 RX ORDER — ONDANSETRON 4 MG/1
4 TABLET, ORALLY DISINTEGRATING ORAL EVERY 6 HOURS PRN
Status: DISCONTINUED | OUTPATIENT
Start: 2022-11-21 | End: 2022-11-22 | Stop reason: HOSPADM

## 2022-11-21 RX ORDER — HYDROMORPHONE HYDROCHLORIDE 1 MG/ML
0.2 INJECTION, SOLUTION INTRAMUSCULAR; INTRAVENOUS; SUBCUTANEOUS EVERY 5 MIN PRN
Status: DISCONTINUED | OUTPATIENT
Start: 2022-11-21 | End: 2022-11-21 | Stop reason: HOSPADM

## 2022-11-21 RX ORDER — ONDANSETRON 2 MG/ML
4 INJECTION INTRAMUSCULAR; INTRAVENOUS EVERY 6 HOURS PRN
Status: DISCONTINUED | OUTPATIENT
Start: 2022-11-21 | End: 2022-11-22 | Stop reason: HOSPADM

## 2022-11-21 RX ORDER — OXYCODONE HYDROCHLORIDE 10 MG/1
10 TABLET ORAL EVERY 4 HOURS PRN
Status: DISCONTINUED | OUTPATIENT
Start: 2022-11-21 | End: 2022-11-22 | Stop reason: HOSPADM

## 2022-11-21 RX ORDER — HYDROMORPHONE HCL IN WATER/PF 6 MG/30 ML
0.4 PATIENT CONTROLLED ANALGESIA SYRINGE INTRAVENOUS
Status: DISCONTINUED | OUTPATIENT
Start: 2022-11-21 | End: 2022-11-22

## 2022-11-21 RX ORDER — NALOXONE HYDROCHLORIDE 0.4 MG/ML
0.4 INJECTION, SOLUTION INTRAMUSCULAR; INTRAVENOUS; SUBCUTANEOUS
Status: DISCONTINUED | OUTPATIENT
Start: 2022-11-21 | End: 2022-11-22 | Stop reason: HOSPADM

## 2022-11-21 RX ORDER — SODIUM CHLORIDE, SODIUM LACTATE, POTASSIUM CHLORIDE, CALCIUM CHLORIDE 600; 310; 30; 20 MG/100ML; MG/100ML; MG/100ML; MG/100ML
INJECTION, SOLUTION INTRAVENOUS CONTINUOUS PRN
Status: DISCONTINUED | OUTPATIENT
Start: 2022-11-21 | End: 2022-11-21

## 2022-11-21 RX ORDER — GLYCOPYRROLATE 0.2 MG/ML
INJECTION, SOLUTION INTRAMUSCULAR; INTRAVENOUS PRN
Status: DISCONTINUED | OUTPATIENT
Start: 2022-11-21 | End: 2022-11-21

## 2022-11-21 RX ORDER — ACETAMINOPHEN 325 MG/1
975 TABLET ORAL
Status: COMPLETED | OUTPATIENT
Start: 2022-11-21 | End: 2022-11-21

## 2022-11-21 RX ORDER — FENTANYL CITRATE 50 UG/ML
INJECTION, SOLUTION INTRAMUSCULAR; INTRAVENOUS PRN
Status: DISCONTINUED | OUTPATIENT
Start: 2022-11-21 | End: 2022-11-21

## 2022-11-21 RX ORDER — FENTANYL CITRATE 50 UG/ML
25 INJECTION, SOLUTION INTRAMUSCULAR; INTRAVENOUS EVERY 5 MIN PRN
Status: DISCONTINUED | OUTPATIENT
Start: 2022-11-21 | End: 2022-11-21 | Stop reason: HOSPADM

## 2022-11-21 RX ORDER — SCOLOPAMINE TRANSDERMAL SYSTEM 1 MG/1
1 PATCH, EXTENDED RELEASE TRANSDERMAL ONCE
Status: COMPLETED | OUTPATIENT
Start: 2022-11-21 | End: 2022-11-21

## 2022-11-21 RX ORDER — HYDROMORPHONE HCL IN WATER/PF 6 MG/30 ML
0.2 PATIENT CONTROLLED ANALGESIA SYRINGE INTRAVENOUS
Status: DISCONTINUED | OUTPATIENT
Start: 2022-11-21 | End: 2022-11-22

## 2022-11-21 RX ORDER — DEXAMETHASONE SODIUM PHOSPHATE 4 MG/ML
INJECTION, SOLUTION INTRA-ARTICULAR; INTRALESIONAL; INTRAMUSCULAR; INTRAVENOUS; SOFT TISSUE PRN
Status: DISCONTINUED | OUTPATIENT
Start: 2022-11-21 | End: 2022-11-21

## 2022-11-21 RX ORDER — CEFAZOLIN SODIUM/WATER 2 G/20 ML
2 SYRINGE (ML) INTRAVENOUS
Status: COMPLETED | OUTPATIENT
Start: 2022-11-21 | End: 2022-11-21

## 2022-11-21 RX ORDER — ACETAMINOPHEN 325 MG/1
975 TABLET ORAL ONCE
Status: COMPLETED | OUTPATIENT
Start: 2022-11-21 | End: 2022-11-21

## 2022-11-21 RX ORDER — LIDOCAINE 40 MG/G
CREAM TOPICAL
Status: DISCONTINUED | OUTPATIENT
Start: 2022-11-21 | End: 2022-11-21

## 2022-11-21 RX ORDER — FENTANYL CITRATE 50 UG/ML
50 INJECTION, SOLUTION INTRAMUSCULAR; INTRAVENOUS EVERY 5 MIN PRN
Status: DISCONTINUED | OUTPATIENT
Start: 2022-11-21 | End: 2022-11-21 | Stop reason: HOSPADM

## 2022-11-21 RX ORDER — BUPIVACAINE HYDROCHLORIDE 2.5 MG/ML
INJECTION, SOLUTION INFILTRATION; PERINEURAL PRN
Status: DISCONTINUED | OUTPATIENT
Start: 2022-11-21 | End: 2022-11-21 | Stop reason: HOSPADM

## 2022-11-21 RX ORDER — MEPERIDINE HYDROCHLORIDE 25 MG/ML
12.5 INJECTION INTRAMUSCULAR; INTRAVENOUS; SUBCUTANEOUS
Status: DISCONTINUED | OUTPATIENT
Start: 2022-11-21 | End: 2022-11-21 | Stop reason: HOSPADM

## 2022-11-21 RX ORDER — LEVOTHYROXINE SODIUM 88 UG/1
88 TABLET ORAL EVERY EVENING
Status: DISCONTINUED | OUTPATIENT
Start: 2022-11-21 | End: 2022-11-21

## 2022-11-21 RX ORDER — OXYCODONE HYDROCHLORIDE 5 MG/1
5 TABLET ORAL EVERY 4 HOURS PRN
Status: DISCONTINUED | OUTPATIENT
Start: 2022-11-21 | End: 2022-11-22 | Stop reason: HOSPADM

## 2022-11-21 RX ORDER — ALBUTEROL SULFATE 0.83 MG/ML
2.5 SOLUTION RESPIRATORY (INHALATION) EVERY 4 HOURS PRN
Status: DISCONTINUED | OUTPATIENT
Start: 2022-11-21 | End: 2022-11-21 | Stop reason: HOSPADM

## 2022-11-21 RX ORDER — ALBUTEROL SULFATE 90 UG/1
2 AEROSOL, METERED RESPIRATORY (INHALATION) EVERY 4 HOURS PRN
Status: DISCONTINUED | OUTPATIENT
Start: 2022-11-21 | End: 2022-11-22

## 2022-11-21 RX ORDER — LIDOCAINE HYDROCHLORIDE 20 MG/ML
INJECTION, SOLUTION INFILTRATION; PERINEURAL PRN
Status: DISCONTINUED | OUTPATIENT
Start: 2022-11-21 | End: 2022-11-21

## 2022-11-21 RX ORDER — ONDANSETRON 2 MG/ML
INJECTION INTRAMUSCULAR; INTRAVENOUS PRN
Status: DISCONTINUED | OUTPATIENT
Start: 2022-11-21 | End: 2022-11-21

## 2022-11-21 RX ORDER — HYDRALAZINE HYDROCHLORIDE 20 MG/ML
2.5-5 INJECTION INTRAMUSCULAR; INTRAVENOUS EVERY 10 MIN PRN
Status: DISCONTINUED | OUTPATIENT
Start: 2022-11-21 | End: 2022-11-21 | Stop reason: HOSPADM

## 2022-11-21 RX ORDER — CEFAZOLIN SODIUM/WATER 2 G/20 ML
2 SYRINGE (ML) INTRAVENOUS SEE ADMIN INSTRUCTIONS
Status: DISCONTINUED | OUTPATIENT
Start: 2022-11-21 | End: 2022-11-21

## 2022-11-21 RX ORDER — SODIUM CHLORIDE, SODIUM LACTATE, POTASSIUM CHLORIDE, CALCIUM CHLORIDE 600; 310; 30; 20 MG/100ML; MG/100ML; MG/100ML; MG/100ML
INJECTION, SOLUTION INTRAVENOUS CONTINUOUS
Status: DISCONTINUED | OUTPATIENT
Start: 2022-11-21 | End: 2022-11-21 | Stop reason: HOSPADM

## 2022-11-21 RX ORDER — SODIUM CHLORIDE 9 MG/ML
INJECTION, SOLUTION INTRAVENOUS CONTINUOUS
Status: DISCONTINUED | OUTPATIENT
Start: 2022-11-21 | End: 2022-11-22 | Stop reason: HOSPADM

## 2022-11-21 RX ADMIN — FENTANYL CITRATE 25 MCG: 50 INJECTION, SOLUTION INTRAMUSCULAR; INTRAVENOUS at 18:35

## 2022-11-21 RX ADMIN — HYDROMORPHONE HYDROCHLORIDE 0.2 MG: 1 INJECTION, SOLUTION INTRAMUSCULAR; INTRAVENOUS; SUBCUTANEOUS at 18:52

## 2022-11-21 RX ADMIN — Medication 50 MG: at 14:03

## 2022-11-21 RX ADMIN — MIDAZOLAM 2 MG: 1 INJECTION INTRAMUSCULAR; INTRAVENOUS at 13:52

## 2022-11-21 RX ADMIN — Medication 20 MG: at 14:37

## 2022-11-21 RX ADMIN — SUGAMMADEX 200 MG: 100 INJECTION, SOLUTION INTRAVENOUS at 17:49

## 2022-11-21 RX ADMIN — LEVOTHYROXINE SODIUM 112 MCG: 0.11 TABLET ORAL at 22:35

## 2022-11-21 RX ADMIN — HYDROMORPHONE HYDROCHLORIDE 0.4 MG: 1 INJECTION, SOLUTION INTRAMUSCULAR; INTRAVENOUS; SUBCUTANEOUS at 18:59

## 2022-11-21 RX ADMIN — FENTANYL CITRATE 50 MCG: 50 INJECTION, SOLUTION INTRAMUSCULAR; INTRAVENOUS at 14:43

## 2022-11-21 RX ADMIN — ONDANSETRON 4 MG: 2 INJECTION INTRAMUSCULAR; INTRAVENOUS at 18:30

## 2022-11-21 RX ADMIN — FENTANYL CITRATE 25 MCG: 50 INJECTION, SOLUTION INTRAMUSCULAR; INTRAVENOUS at 18:43

## 2022-11-21 RX ADMIN — FENTANYL CITRATE 50 MCG: 50 INJECTION, SOLUTION INTRAMUSCULAR; INTRAVENOUS at 16:29

## 2022-11-21 RX ADMIN — SODIUM CHLORIDE: 9 INJECTION, SOLUTION INTRAVENOUS at 18:30

## 2022-11-21 RX ADMIN — HYDROMORPHONE HYDROCHLORIDE 0.3 MG: 1 INJECTION, SOLUTION INTRAMUSCULAR; INTRAVENOUS; SUBCUTANEOUS at 17:25

## 2022-11-21 RX ADMIN — ACETAMINOPHEN 975 MG: 325 TABLET, FILM COATED ORAL at 12:17

## 2022-11-21 RX ADMIN — ACETAMINOPHEN 975 MG: 325 TABLET, FILM COATED ORAL at 20:44

## 2022-11-21 RX ADMIN — Medication 2 G: at 14:18

## 2022-11-21 RX ADMIN — SCOPALAMINE 1 PATCH: 1 PATCH, EXTENDED RELEASE TRANSDERMAL at 13:42

## 2022-11-21 RX ADMIN — Medication 10 MG: at 16:55

## 2022-11-21 RX ADMIN — HYDROMORPHONE HYDROCHLORIDE 0.2 MG: 1 INJECTION, SOLUTION INTRAMUSCULAR; INTRAVENOUS; SUBCUTANEOUS at 20:32

## 2022-11-21 RX ADMIN — Medication 10 MG: at 15:47

## 2022-11-21 RX ADMIN — HYDROMORPHONE HYDROCHLORIDE 0.4 MG: 0.2 INJECTION, SOLUTION INTRAMUSCULAR; INTRAVENOUS; SUBCUTANEOUS at 22:35

## 2022-11-21 RX ADMIN — HYDROMORPHONE HYDROCHLORIDE 0.2 MG: 1 INJECTION, SOLUTION INTRAMUSCULAR; INTRAVENOUS; SUBCUTANEOUS at 17:29

## 2022-11-21 RX ADMIN — SODIUM CHLORIDE, POTASSIUM CHLORIDE, SODIUM LACTATE AND CALCIUM CHLORIDE: 600; 310; 30; 20 INJECTION, SOLUTION INTRAVENOUS at 13:57

## 2022-11-21 RX ADMIN — FENTANYL CITRATE 25 MCG: 50 INJECTION, SOLUTION INTRAMUSCULAR; INTRAVENOUS at 18:27

## 2022-11-21 RX ADMIN — FENTANYL CITRATE 50 MCG: 50 INJECTION, SOLUTION INTRAMUSCULAR; INTRAVENOUS at 15:47

## 2022-11-21 RX ADMIN — Medication 20 MG: at 15:24

## 2022-11-21 RX ADMIN — FENTANYL CITRATE 150 MCG: 50 INJECTION, SOLUTION INTRAMUSCULAR; INTRAVENOUS at 14:03

## 2022-11-21 RX ADMIN — SODIUM CHLORIDE, POTASSIUM CHLORIDE, SODIUM LACTATE AND CALCIUM CHLORIDE: 600; 310; 30; 20 INJECTION, SOLUTION INTRAVENOUS at 16:25

## 2022-11-21 RX ADMIN — Medication 5 MG: at 17:22

## 2022-11-21 RX ADMIN — FENTANYL CITRATE 25 MCG: 50 INJECTION, SOLUTION INTRAMUSCULAR; INTRAVENOUS at 18:20

## 2022-11-21 RX ADMIN — PROPOFOL 200 MG: 10 INJECTION, EMULSION INTRAVENOUS at 14:03

## 2022-11-21 RX ADMIN — HYDROMORPHONE HYDROCHLORIDE 0.4 MG: 1 INJECTION, SOLUTION INTRAMUSCULAR; INTRAVENOUS; SUBCUTANEOUS at 19:09

## 2022-11-21 RX ADMIN — DEXAMETHASONE SODIUM PHOSPHATE 8 MG: 4 INJECTION, SOLUTION INTRA-ARTICULAR; INTRALESIONAL; INTRAMUSCULAR; INTRAVENOUS; SOFT TISSUE at 14:03

## 2022-11-21 RX ADMIN — LIDOCAINE HYDROCHLORIDE 100 MG: 20 INJECTION, SOLUTION INFILTRATION; PERINEURAL at 14:03

## 2022-11-21 RX ADMIN — GLYCOPYRROLATE 0.2 MG: 0.2 INJECTION, SOLUTION INTRAMUSCULAR; INTRAVENOUS at 14:03

## 2022-11-21 RX ADMIN — ONDANSETRON 4 MG: 2 INJECTION INTRAMUSCULAR; INTRAVENOUS at 17:30

## 2022-11-21 ASSESSMENT — ACTIVITIES OF DAILY LIVING (ADL)
ADLS_ACUITY_SCORE: 18
ADLS_ACUITY_SCORE: 24
WEAR_GLASSES_OR_BLIND: NO
DIFFICULTY_EATING/SWALLOWING: NO
CONCENTRATING,_REMEMBERING_OR_MAKING_DECISIONS_DIFFICULTY: NO
DRESSING/BATHING_DIFFICULTY: NO
TOILETING_ISSUES: NO
CHANGE_IN_FUNCTIONAL_STATUS_SINCE_ONSET_OF_CURRENT_ILLNESS/INJURY: NO
ADLS_ACUITY_SCORE: 24
ADLS_ACUITY_SCORE: 24
DOING_ERRANDS_INDEPENDENTLY_DIFFICULTY: NO
WALKING_OR_CLIMBING_STAIRS_DIFFICULTY: NO
ADLS_ACUITY_SCORE: 24
FALL_HISTORY_WITHIN_LAST_SIX_MONTHS: NO
ADLS_ACUITY_SCORE: 24

## 2022-11-21 NOTE — BRIEF OP NOTE
[facility]    Brief Operative Note    Pre-operative diagnosis: Right renal mass [N28.89]  Post-operative diagnosis Same as pre-operative diagnosis    Procedure: Procedure(s):  NEPHRECTOMY, PARTIAL, ROBOT-ASSISTED, LAPAROSCOPIC  Surgeon: Surgeon(s) and Role:     * Carlos Mcdonnell MD - Primary     * Sharla Andersen MD - Resident - Assisting  Anesthesia: General   Estimated Blood Loss: Less than 100 ml    Drains: Carmine-Tellez  Specimens:   ID Type Source Tests Collected by Time Destination   1 : Base of tumor bed Tissue Other SURGICAL PATHOLOGY EXAM Carlos Mcdonnell MD 11/21/2022  4:37 PM    2 : Right Renal Mass Tissue Other SURGICAL PATHOLOGY EXAM Carlos Mcdonnell MD 11/21/2022  5:37 PM      Findings:   see operative report. Frozen section negative.  Complications: None.  Implants: * No implants in log *      Plan  Admit to urology outpt in a bed  Follow up PACU labs  Drain out tomorrow if low outputs, no need for reflexive JPCr  ADAT  Hills out in AM

## 2022-11-21 NOTE — ANESTHESIA PREPROCEDURE EVALUATION
Anesthesia Pre-Procedure Evaluation    Patient: Gualberto Lehman   MRN: [id number] : 1980        Procedure : Procedure(s):  NEPHRECTOMY, PARTIAL, ROBOT-ASSISTED, LAPAROSCOPIC; POSSIBLE RADICAL NEPHRECTOMY          Past Medical History:   Diagnosis Date     Family history of asthma     only as infant personally     PONV (postoperative nausea and vomiting)      Uncomplicated asthma      Vitiligo     hands and genitals  since age 18      Past Surgical History:   Procedure Laterality Date     [facility] EXCISION OF TONSIL TAGS      age 13      Allergies   Allergen Reactions     Seasonal Allergies       Social History     Tobacco Use     Smoking status: Never     Smokeless tobacco: Never   Substance Use Topics     Alcohol use: Yes     Comment: sometimes      Wt Readings from Last 1 Encounters:   22 103 kg (227 lb 1.2 oz)        Anesthesia Evaluation   Pt has had prior anesthetic.     History of anesthetic complications  - motion sickness and PONV.      ROS/MED HX  ENT/Pulmonary:     (+) asthma     Neurologic:       Cardiovascular:     (+) Dyslipidemia -----    METS/Exercise Tolerance:     Hematologic:       Musculoskeletal:       GI/Hepatic:    (-) GERD   Renal/Genitourinary:       Endo:     (+) thyroid problem,     Psychiatric/Substance Use:       Infectious Disease:       Malignancy: Comment: Renal mass  (+) Malignancy,     Other:            Physical Exam    Airway        Mallampati: I   TM distance: > 3 FB   Neck ROM: full   Mouth opening: > 3 cm    Respiratory Devices and Support         Dental  no notable dental history     (+) implants      Cardiovascular             Pulmonary                   OUTSIDE LABS:  CBC:   Lab Results   Component Value Date    WBC 7.6 2022    HGB 15.5 2022    HCT 46.2 2022     2022     BMP:   Lab Results   Component Value Date     2022    POTASSIUM 4.5 2022    POTASSIUM 4.1 2022    CHLORIDE 103 2022    CO2 22 2022     BUN 19 08/02/2022    CR 0.94 11/21/2022    CR 1.21 08/02/2022     (H) 11/21/2022    GLC 94 08/02/2022     COAGS: No results found for: PTT, INR, FIBR  POC: No results found for: BGM, HCG, HCGS  HEPATIC:   Lab Results   Component Value Date    ALBUMIN 4.1 08/02/2022    PROTTOTAL 7.9 08/02/2022    ALT 31 08/02/2022    AST 32 08/02/2022    ALKPHOS 103 08/02/2022    BILITOTAL 0.5 08/02/2022     OTHER:   Lab Results   Component Value Date    CATHIE 9.8 08/02/2022    LIPASE 32 08/02/2022       Anesthesia Plan    ASA Status:  2   NPO Status:  NPO Appropriate    Anesthesia Type: General.     - Airway: ETT   Induction: Intravenous, Propofol.   Maintenance: Balanced.   Techniques and Equipment:     - Lines/Monitors: 2nd IV     Consents    Anesthesia Plan(s) and associated risks, benefits, and realistic alternatives discussed. Questions answered and patient/representative(s) expressed understanding.    - Discussed:     - Discussed with:  Patient      - Extended Intubation/Ventilatory Support Discussed: No.      - Patient is DNR/DNI Status: No    Use of blood products discussed: Yes.     - Discussed with: Patient.     - Consented: consented to blood products            Reason for refusal: other.     Postoperative Care    Pain management: IV analgesics, Oral pain medications, Multi-modal analgesia.   PONV prophylaxis: Ondansetron (or other 5HT-3), Dexamethasone or Solumedrol, Scopolamine patch     Comments:           H&P reviewed: Unable to attach H&P to encounter due to EHR limitations. H&P Update: appropriate H&P reviewed, patient examined. No interval changes since H&P (within 30 days).         Renny Munoz MD

## 2022-11-22 VITALS
BODY MASS INDEX: 32.51 KG/M2 | DIASTOLIC BLOOD PRESSURE: 69 MMHG | SYSTOLIC BLOOD PRESSURE: 122 MMHG | HEART RATE: 74 BPM | WEIGHT: 227.07 LBS | OXYGEN SATURATION: 90 % | HEIGHT: 70 IN | RESPIRATION RATE: 17 BRPM | TEMPERATURE: 98.4 F

## 2022-11-22 LAB
ANION GAP SERPL CALCULATED.3IONS-SCNC: 14 MMOL/L (ref 7–15)
BASOPHILS # BLD AUTO: 0 10E3/UL (ref 0–0.2)
BASOPHILS NFR BLD AUTO: 0 %
BUN SERPL-MCNC: 18.5 MG/DL (ref 6–20)
CALCIUM SERPL-MCNC: 9 MG/DL (ref 8.6–10)
CHLORIDE SERPL-SCNC: 103 MMOL/L (ref 98–107)
CREAT SERPL-MCNC: 1.2 MG/DL (ref 0.67–1.17)
DEPRECATED HCO3 PLAS-SCNC: 22 MMOL/L (ref 22–29)
EOSINOPHIL # BLD AUTO: 0 10E3/UL (ref 0–0.7)
EOSINOPHIL NFR BLD AUTO: 0 %
ERYTHROCYTE [DISTWIDTH] IN BLOOD BY AUTOMATED COUNT: 13.1 % (ref 10–15)
GFR SERPL CREATININE-BSD FRML MDRD: 77 ML/MIN/1.73M2
GLUCOSE BLDC GLUCOMTR-MCNC: 120 MG/DL (ref 70–99)
GLUCOSE SERPL-MCNC: 115 MG/DL (ref 70–99)
HCT VFR BLD AUTO: 43.8 % (ref 40–53)
HGB BLD-MCNC: 14.7 G/DL (ref 13.3–17.7)
IMM GRANULOCYTES # BLD: 0.1 10E3/UL
IMM GRANULOCYTES NFR BLD: 1 %
LYMPHOCYTES # BLD AUTO: 1.9 10E3/UL (ref 0.8–5.3)
LYMPHOCYTES NFR BLD AUTO: 8 %
MCH RBC QN AUTO: 30 PG (ref 26.5–33)
MCHC RBC AUTO-ENTMCNC: 33.6 G/DL (ref 31.5–36.5)
MCV RBC AUTO: 89 FL (ref 78–100)
MONOCYTES # BLD AUTO: 1.9 10E3/UL (ref 0–1.3)
MONOCYTES NFR BLD AUTO: 8 %
NEUTROPHILS # BLD AUTO: 18.2 10E3/UL (ref 1.6–8.3)
NEUTROPHILS NFR BLD AUTO: 83 %
NRBC # BLD AUTO: 0 10E3/UL
NRBC BLD AUTO-RTO: 0 /100
PLATELET # BLD AUTO: 300 10E3/UL (ref 150–450)
POTASSIUM SERPL-SCNC: 4.3 MMOL/L (ref 3.4–5.3)
RBC # BLD AUTO: 4.9 10E6/UL (ref 4.4–5.9)
SODIUM SERPL-SCNC: 139 MMOL/L (ref 136–145)
WBC # BLD AUTO: 22 10E3/UL (ref 4–11)

## 2022-11-22 PROCEDURE — 250N000011 HC RX IP 250 OP 636: Performed by: STUDENT IN AN ORGANIZED HEALTH CARE EDUCATION/TRAINING PROGRAM

## 2022-11-22 PROCEDURE — 250N000013 HC RX MED GY IP 250 OP 250 PS 637: Performed by: STUDENT IN AN ORGANIZED HEALTH CARE EDUCATION/TRAINING PROGRAM

## 2022-11-22 PROCEDURE — 36415 COLL VENOUS BLD VENIPUNCTURE: CPT | Performed by: PHYSICIAN ASSISTANT

## 2022-11-22 PROCEDURE — 258N000003 HC RX IP 258 OP 636: Performed by: STUDENT IN AN ORGANIZED HEALTH CARE EDUCATION/TRAINING PROGRAM

## 2022-11-22 PROCEDURE — 85025 COMPLETE CBC W/AUTO DIFF WBC: CPT | Performed by: PHYSICIAN ASSISTANT

## 2022-11-22 PROCEDURE — 250N000013 HC RX MED GY IP 250 OP 250 PS 637: Performed by: PHYSICIAN ASSISTANT

## 2022-11-22 PROCEDURE — 80048 BASIC METABOLIC PNL TOTAL CA: CPT | Performed by: PHYSICIAN ASSISTANT

## 2022-11-22 RX ORDER — ALBUTEROL SULFATE 90 UG/1
2 AEROSOL, METERED RESPIRATORY (INHALATION) EVERY 4 HOURS PRN
Status: DISCONTINUED | OUTPATIENT
Start: 2022-11-22 | End: 2022-11-22 | Stop reason: HOSPADM

## 2022-11-22 RX ORDER — METHOCARBAMOL 500 MG/1
500 TABLET, FILM COATED ORAL 4 TIMES DAILY PRN
Qty: 30 TABLET | Refills: 0 | Status: SHIPPED | OUTPATIENT
Start: 2022-11-22 | End: 2023-03-22

## 2022-11-22 RX ORDER — OXYCODONE HYDROCHLORIDE 10 MG/1
5-10 TABLET ORAL EVERY 6 HOURS PRN
Qty: 14 TABLET | Refills: 0 | Status: SHIPPED | OUTPATIENT
Start: 2022-11-22 | End: 2023-03-22

## 2022-11-22 RX ORDER — AMOXICILLIN 250 MG
1-2 CAPSULE ORAL 2 TIMES DAILY
Qty: 45 TABLET | Refills: 0 | Status: SHIPPED | OUTPATIENT
Start: 2022-11-22 | End: 2023-03-22

## 2022-11-22 RX ORDER — ACETAMINOPHEN 325 MG/1
650 TABLET ORAL EVERY 4 HOURS PRN
Qty: 100 TABLET | Refills: 0 | Status: SHIPPED | OUTPATIENT
Start: 2022-11-22 | End: 2023-03-22

## 2022-11-22 RX ORDER — METHOCARBAMOL 500 MG/1
500 TABLET, FILM COATED ORAL 4 TIMES DAILY
Status: DISCONTINUED | OUTPATIENT
Start: 2022-11-22 | End: 2022-11-22 | Stop reason: HOSPADM

## 2022-11-22 RX ORDER — IBUPROFEN 600 MG/1
600 TABLET, FILM COATED ORAL 3 TIMES DAILY
Status: DISCONTINUED | OUTPATIENT
Start: 2022-11-22 | End: 2022-11-22 | Stop reason: HOSPADM

## 2022-11-22 RX ORDER — HYDROMORPHONE HCL IN WATER/PF 6 MG/30 ML
0.4 PATIENT CONTROLLED ANALGESIA SYRINGE INTRAVENOUS EVERY 4 HOURS PRN
Status: DISCONTINUED | OUTPATIENT
Start: 2022-11-22 | End: 2022-11-22 | Stop reason: HOSPADM

## 2022-11-22 RX ORDER — LIDOCAINE 4 G/G
1-2 PATCH TOPICAL EVERY 24 HOURS
Qty: 15 PATCH | Refills: 0 | Status: SHIPPED | OUTPATIENT
Start: 2022-11-22 | End: 2023-03-22

## 2022-11-22 RX ORDER — LIDOCAINE 4 G/G
1-2 PATCH TOPICAL
Status: DISCONTINUED | OUTPATIENT
Start: 2022-11-22 | End: 2022-11-22 | Stop reason: HOSPADM

## 2022-11-22 RX ORDER — ACETAMINOPHEN 325 MG/1
975 TABLET ORAL EVERY 6 HOURS
Status: DISCONTINUED | OUTPATIENT
Start: 2022-11-22 | End: 2022-11-22 | Stop reason: HOSPADM

## 2022-11-22 RX ORDER — HYDROMORPHONE HCL IN WATER/PF 6 MG/30 ML
0.2 PATIENT CONTROLLED ANALGESIA SYRINGE INTRAVENOUS EVERY 4 HOURS PRN
Status: DISCONTINUED | OUTPATIENT
Start: 2022-11-22 | End: 2022-11-22 | Stop reason: HOSPADM

## 2022-11-22 RX ADMIN — METHOCARBAMOL 500 MG: 500 TABLET ORAL at 12:09

## 2022-11-22 RX ADMIN — OXYCODONE HYDROCHLORIDE 10 MG: 10 TABLET ORAL at 01:19

## 2022-11-22 RX ADMIN — HYDROMORPHONE HYDROCHLORIDE 0.4 MG: 0.2 INJECTION, SOLUTION INTRAMUSCULAR; INTRAVENOUS; SUBCUTANEOUS at 04:06

## 2022-11-22 RX ADMIN — LIDOCAINE PATCH 4% 1 PATCH: 40 PATCH TOPICAL at 09:06

## 2022-11-22 RX ADMIN — SODIUM CHLORIDE: 9 INJECTION, SOLUTION INTRAVENOUS at 09:13

## 2022-11-22 RX ADMIN — POLYETHYLENE GLYCOL 3350 17 G: 17 POWDER, FOR SOLUTION ORAL at 09:07

## 2022-11-22 RX ADMIN — METHOCARBAMOL 500 MG: 500 TABLET ORAL at 09:06

## 2022-11-22 RX ADMIN — OXYCODONE HYDROCHLORIDE 10 MG: 10 TABLET ORAL at 10:24

## 2022-11-22 RX ADMIN — CETIRIZINE HYDROCHLORIDE 10 MG: 10 TABLET, FILM COATED ORAL at 09:06

## 2022-11-22 RX ADMIN — ACETAMINOPHEN 975 MG: 325 TABLET, FILM COATED ORAL at 09:06

## 2022-11-22 RX ADMIN — OXYCODONE HYDROCHLORIDE 10 MG: 10 TABLET ORAL at 05:59

## 2022-11-22 RX ADMIN — SODIUM CHLORIDE: 9 INJECTION, SOLUTION INTRAVENOUS at 01:22

## 2022-11-22 ASSESSMENT — ACTIVITIES OF DAILY LIVING (ADL)
ADLS_ACUITY_SCORE: 24
ADLS_ACUITY_SCORE: 22
ADLS_ACUITY_SCORE: 18
ADLS_ACUITY_SCORE: 22
ADLS_ACUITY_SCORE: 18
ADLS_ACUITY_SCORE: 24
ADLS_ACUITY_SCORE: 22

## 2022-11-22 NOTE — PLAN OF CARE
"Goal Outcome Evaluation:    /69 (BP Location: Left arm)   Pulse 74   Temp 98.4  F (36.9  C) (Oral)   Resp 17   Ht 1.778 m (5' 10\")   Wt 103 kg (227 lb 1.2 oz)   SpO2 90%   BMI 32.58 kg/m      Status: VSS, POD #1 Partial R nephrectomy  Pain/Nausea: Pain in abdomen- managed with PRN oxycodone. Denies N/V  Mobility: SBA- ambulated in liu  Diet: Regular- tolerating  Labs:   LDAs: L PIV- NS @125mL/hr, R PIV-SL, R MOISE- bloody output  Skin/incisions: Intact ex 2 lap sites- glued and AURELIA, MOISE site-CDI  Neuro: A&Ox4, N/T in R hand, CMS intact  Respiratory: RA sating >90%. LS clear-diminished. IS encouraged. Denies SOB  Cardiac: WDL, denies chest pain  GI/: Voiding spontaneously without difficulty. LBM 11/21, hypoactive BS, not passing flatus  New Changes: Removed noble  Plan: Continue with POC, discharge home today      "

## 2022-11-22 NOTE — PHARMACY-ADMISSION MEDICATION HISTORY
Admission Medication History Completed by Pharmacy    See [facility] Admission Navigator for allergy information, preferred outpatient pharmacy, prior to admission medications and immunization status.     Medication History Sources: Patient and Chart Review     Additional Information: None    Medication Sig Last Dose   albuterol (PROAIR HFA/PROVENTIL HFA/VENTOLIN HFA) 108 (90 Base) MCG/ACT inhaler INHALE 2 PUFFS BY MOUTH EVERY 4 HOURS AS NEEDED FOR WHEEZING OR COUGH  11/20/2022 at 2200   cetirizine (ZYRTEC) 10 MG tablet Take 10 mg by mouth daily  11/21/2022 at 0900   levothyroxine (SYNTHROID/LEVOTHROID) 112 MCG tablet Take 112 mcg by mouth every evening 11/20/2022 at 2200     Date completed: 11/21/22    Medication history completed by: Wendy Will, PharmD, BCPS

## 2022-11-22 NOTE — PROGRESS NOTES
"Urology  Progress Note    NAEO  Pain well controlled with oral and IV prns  Tolerating solid food - no n/v  Not passing gas   Not ambulating   Hills in place    Exam  BP (!) 140/78 (BP Location: Left arm)   Pulse 74   Temp 98.9  F (37.2  C) (Oral)   Resp 16   Ht 1.778 m (5' 10\")   Wt 103 kg (227 lb 1.2 oz)   SpO2 94%   BMI 32.58 kg/m    No acute distress  Unlabored breathing  Abdomen soft,  appropriately tender, nondistended. Incisions c/d/i  MOISE serosanguinous  Hills with clear yellow urine in tubing    /480  MOISE 70/65    Labs  Recent Labs   Lab Test 11/21/22  2110 11/21/22  1853 11/21/22  1209 08/02/22  2144   WBC  --   --   --  7.6   HGB 15.2 14.8  --  15.5   CR 1.17 1.15 0.94 1.21        AM labs pending    Assessment/Plan  42 year old y/o male POD#1 s/p robotic right partial nephrectomy.     Neuro: Tylenol, prn oxy/dilaudid/toradol (add on pending labs) for pain control  CV: SPENCER  Pulm: IS while awake  FEN/GI: regular diet, MIVF @ 125/hr. Bowel regimen.  Endo: PTA levo  : Hills to be discontinued today, MOISE to be removed pending outputs   Heme/ID: Hgb stable  Activity: Up ad reuben  PPx: SCDs.   Dispo: med/surg; possible discharge today if ambulating, tolerating diet, and pain controlled on orals     Seen and examined with the chief resident. Will discuss with Dr. Mcdonnell.    Alan Bradford MD  Urology Resident     Contacting the Urology Team     Please use the following job codes to reach the Urology Team. Note that you must use an in house phone and that job codes cannot receive text pages.     On weekdays, dial [phone] (or star-star-star [phone] on the new [employer] telephones) then [phone] to reach the Adult Urology resident or PA on call    On weekdays, dial [phone] (or star-star-star [phone] on the new [employer] telephones) then [phone] to reach the Pediatric Urology resident    On weeknights and weekends, dial [phone] (or star-star-star 777 on the new [employer] telephones) then [phone] to reach the Urology resident on call (for both Adult " and Pediatrics)

## 2022-11-22 NOTE — ANESTHESIA CARE TRANSFER NOTE
Patient: Gualberto Lehman    Procedure: Procedure(s):  NEPHRECTOMY, PARTIAL, ROBOT-ASSISTED, LAPAROSCOPIC       Diagnosis: Right renal mass [N28.89]  Diagnosis Additional Information: No value filed.    Anesthesia Type:   General     Note:    Oropharynx: oropharynx clear of all foreign objects and spontaneously breathing  Level of Consciousness: awake  Oxygen Supplementation: nasal cannula  Level of Supplemental Oxygen (L/min / FiO2): 3  Independent Airway: airway patency satisfactory and stable  Dentition: dentition unchanged  Vital Signs Stable: post-procedure vital signs reviewed and stable  Report to RN Given: handoff report given  Patient transferred to: PACU    Handoff Report: Identifed the Patient, Identified the Reponsible Provider, Reviewed the pertinent medical history, Discussed the surgical course, Reviewed Intra-OP anesthesia mangement and issues during anesthesia, Set expectations for post-procedure period and Allowed opportunity for questions and acknowledgement of understanding      Vitals:  Vitals Value Taken Time   /77    Temp 36.1C    Pulse 102 11/21/22 1816   Resp 10    SpO2 97 % 11/21/22 1816   Vitals shown include unvalidated device data.    Electronically Signed By: FRANKLIN Sparks CRNA  November 21, 2022  6:17 PM

## 2022-11-22 NOTE — ADDENDUM NOTE
Addendum  created 11/21/22 2044 by Lee Ann Delgadillo MD    Review and Sign - Ready for Procedure

## 2022-11-22 NOTE — OP NOTE
DATE OF SURGERY: 11/21/22  PREOPERATIVE DIAGNOSIS:  Right renal mass.   POSTOPERATIVE DIAGNOSIS: Right renal mass.   PROCEDURE PERFORMED:   1) Right robotic-assisted laparoscopic partial nephrectomy;   2) Laparoscopic ultrasound of renal mass with intraoperative interpretation of imaging      STAFF SURGEON: Carlos Mcdonnell MD, present and scrubbed for all critical portions of the procedure, including the entire radiographic portion.  RESIDENT SURGEON: Sharla Andersen MD; Wendi Negron MD  ANESTHESIA: General.   ESTIMATED BLOOD LOSS: 100 mL.   DRAINS AND TUBES: Hills catheter; MOISE drain  COMPLICATIONS: None.   DISPOSITION: PACU.   SPECIMENS OBTAINED:   ID Type Source Tests Collected by Time Destination   1 : Base of tumor bed Tissue Other SURGICAL PATHOLOGY EXAM Carlos Mcdonnell MD 11/21/2022  4:37 PM    2 : Right Renal Mass Tissue Other SURGICAL PATHOLOGY EXAM Carlos Mcdonnell MD 11/21/2022  5:37 PM      SIGNIFICANT FINDINGS: Tumor was resected with visually negative margins. Frozen section from base of tumor bed was negative for malignancy.   HISTORY OF PRESENT ILLNESS: Gustavo Lehman is a 42 year old male with a history of a right-sided upper pole, endophytic renal mass. After a discussion of all risks, benefits, and alternatives, the patient elected to undergo definitive management with a partial nephrectomy.  OPERATION PERFORMED:   Informed consent was obtained and the patient was brought to the operating room where general anesthesia was induced. He was given appropriate preoperative antibiotics and positioned in the full flank position where all pressure points were carefully padded and secured. He was then prepped and draped in the usual sterile fashion. We then performed a timeout, verifying the correct patient's site and procedure to be performed.    Incision was made superior and lateral to the umbilicus a the lateral edge of the rectus. After confirmatory drop test, the  Veress needle was used for insufflation. We placed a 12 mm assistant port and three 8 mm robotic ports. The robot was docked. A liver retractor was placed through the 5 mm port, using a locking Allis clamp. The colon was reflected medially to expose the anterior surface of Gerota's fascia. The medial aspect of the kidney was exposed and the ureter and gonadal vein were identified. Kidney was lifted off the psoas muscle and dissection was carried posteriorly until the renal artery and vein were identified. There were two renal arteries and two renal veins. The hilar vessels were carefully skeletonized. Once this was exposed, Gerota's was opened and the kidney was completely mobilized. The mass location was noted in the upper pole, laterally. Mass was endophytic. The ultrasound probe was then used to verify location of mass and outline the borders for dissection.  Artery was then clamped and excised the tumor using sharp dissection. Once the tumor was free, the base of the resection bed using was oversewn using 3-0 V-lock suture. At this point we unclamped the hilum; total clamp time was 19 minutes. The defect in the parenchyma was then closed using the interrupred 0-Vicryl sutures for capsular closure with sliding weck technique. A second weck placed for security on each stitch. Theses were placed over Surgicel and Floseal. Hemostasis appeared excellent. Gerota's fascia was re approximated over the kidney and resection bed.  Specimen was placed in a 10 mm EndoCatch bag.  MOISE drain was inserted. Ports were removed. The specimen was removed and sent to pathology. We closed the fascia from the extraction site with 0-Vicryl in an interrupted fashion. Skin was re approximated using 4-0 Monocryl. The wounds were dressed with skin glue. The patient was then awakened and taken to the PACU in stable condition.    Carlos Mcdonnell MD  Urology  [facility]

## 2022-11-22 NOTE — DISCHARGE SUMMARY
[facility] UroDischarge Summary    Patient: Gualberto Lehman    MRN: [id number]   : 1980         Date of Admission:  2022   Date of Discharge::  2022  Admitting Physician:  Carlos Mcdonnell MD  Discharge Physician:  Mary Cid PA-C             Admission Diagnoses:   Right renal mass [N28.89]    Past Medical History:   Diagnosis Date     Family history of asthma     only as infant personally     PONV (postoperative nausea and vomiting)      Uncomplicated asthma      Vitiligo     hands and genitals  since age 18             Discharge Diagnosis:     Right renal mass [N28.89]    Past Medical History:   Diagnosis Date     Family history of asthma     only as infant personally     PONV (postoperative nausea and vomiting)      Uncomplicated asthma      Vitiligo     hands and genitals  since age 18          Procedures:     Procedure(s): 22 -   PROCEDURE PERFORMED:   1) Right robotic-assisted laparoscopic partial nephrectomy;   2) Laparoscopic ultrasound of renal mass with intraoperative interpretation of imaging   Dr. Carlos Mcdonnell (Urology)            Medications Prior to Admission:     Medications Prior to Admission   Medication Sig Dispense Refill Last Dose     albuterol (PROAIR HFA/PROVENTIL HFA/VENTOLIN HFA) 108 (90 Base) MCG/ACT inhaler albuterol sulfate HFA 90 mcg/actuation aerosol inhaler   INHALE 2 PUFFS BY MOUTH EVERY 4 HOURS AS NEEDED FOR WHEEZING OR COUGH OR PRIOR TO EXERCISE   2022 at 2200     cetirizine (ZYRTEC) 10 MG tablet Take 10 mg by mouth daily   2022 at 0900     levothyroxine (SYNTHROID/LEVOTHROID) 88 MCG tablet Take 112 mcg by mouth every evening   2022 at 2200             Discharge Medications:     Current Discharge Medication List      START taking these medications    Details   acetaminophen (TYLENOL) 325 MG tablet Take 2 tablets (650 mg) by mouth every 4 hours as needed for mild pain  Qty: 100 tablet, Refills: 0    Associated Diagnoses:  Postoperative pain      ibuprofen (MOTRIN) 600 MG ED starter pack Take 600 mg by mouth every 8 hours as needed for moderate pain (4-6)  Qty: 30 tablet, Refills: 0    Associated Diagnoses: Postoperative pain      Lidocaine (LIDOCARE) 4 % Patch Place 1-2 patches onto the skin every 24 hours To prevent lidocaine toxicity, patient should be patch free for 12 hrs daily.  Qty: 15 patch, Refills: 0    Associated Diagnoses: Postoperative pain      methocarbamol (ROBAXIN) 500 MG tablet Take 1 tablet (500 mg) by mouth 4 times daily as needed for muscle spasms (abdominal pain)  Qty: 30 tablet, Refills: 0    Associated Diagnoses: Postoperative pain      oxyCODONE IR (ROXICODONE) 10 MG tablet Take 0.5-1 tablets (5-10 mg) by mouth every 6 hours as needed for moderate to severe pain  Qty: 14 tablet, Refills: 0    Associated Diagnoses: Postoperative pain      senna-docusate (SENOKOT-S/PERICOLACE) 8.6-50 MG tablet Take 1-2 tablets by mouth 2 times daily To prevent / treat constipation  Qty: 45 tablet, Refills: 0    Associated Diagnoses: Postoperative pain         CONTINUE these medications which have NOT CHANGED    Details   albuterol (PROAIR HFA/PROVENTIL HFA/VENTOLIN HFA) 108 (90 Base) MCG/ACT inhaler albuterol sulfate HFA 90 mcg/actuation aerosol inhaler   INHALE 2 PUFFS BY MOUTH EVERY 4 HOURS AS NEEDED FOR WHEEZING OR COUGH OR PRIOR TO EXERCISE      cetirizine (ZYRTEC) 10 MG tablet Take 10 mg by mouth daily      levothyroxine (SYNTHROID/LEVOTHROID) 88 MCG tablet Take 112 mcg by mouth every evening                   Consultations:   Consultation during this admission received:   None          Brief History of Illness:   Reason for admission requiring a surgical or invasive procedure:   Right renal mass [N28.89]   The patient underwent the following procedure(s):   See above   There were no immediate complications during this procedure.    Please refer to the full operative summary for details.           Hospital Course:   The  patient's hospital course was unremarkable.  Gualberto Lehman recovered as anticipated and experienced no post-operative complications.      On POD #1 he was ambulating without assitance, tolerating the discharge diet, had pain controlled with PO medications to go home with, and was requiring no IV medications or fluids. His Hills was removed and he voided without difficulty. He was discharged to  with appropriate contact information, follow-up and instructions as seen below in the discharge paperwork.  Prior to discharge his surgical drain was removed.          Discharge Labs:     No results found for: PSA  Recent Labs   Lab 11/22/22 0925 11/21/22 2110   WBC 22.0*  --    HGB 14.7 15.2     --      Recent Labs   Lab 11/22/22 0925 11/22/22 0924 11/21/22 2110     --  139   POTASSIUM 4.3  --  4.2   CHLORIDE 103  --  101   CO2 22  --  21*   BUN 18.5  --  17.3   CR 1.20*  --  1.17   * 120* 140*   CATHIE 9.0  --  9.1     No lab results found in last 7 days.    Invalid input(s): URINEBLOOD  No results found for this or any previous visit.         Discharge Instructions and Follow-Up:   Diet:  - Regular/ home diet    Activity:   - No strenuous exercise for 4 weeks.   - No lifting, pushing, pulling more than 15 pounds for 4 weeks.   - Do not strain with bowel movements.   - Do not drive until you can press the brake pedal quickly and fully without pain.   - Do not operate a motor vehicle while taking narcotic pain medications.     Medications:   - PAIN: Oxycodone is a narcotic medication that has been prescribed for pain.  Narcotics will cause sleepiness and constipation and can become addictive, therefore it is best to stop or reduce them as soon as you can.  Any left over narcotics should be disposed of with an Authorized  for unneeded medications.  Contact your [facility]'s or Critical access hospital government's household trash and recycling service to learn about medication disposal options and guidelines for your  area.  If you decide to store this medication at home it should be kept in a locked cabinet to prevent access to children or visitors. Never drive, operate machinery or drink alcoholic beverages while you are taking narcotic pain medications.  To reduce your narcotic use, take both Tylenol (acetaminophen 625mg) and ibuprofen (600mg) as directed.  These have been prescribed for you.  Do not take more than 3,200mg of Tylenol (acetaminophen/ APAP) from all sources in any 24 hour period since this can cause liver damage.  Do not take more than 2400mg of ibuprofen in any 24 hour period since this can cause kidney damage.     2) CONSTIPATION: Pericolace (senna/docusate sodium) can be taken twice daily for prevention of constipation since surgery, pain medications and bladder spasm medications can all make you constipated.  Please reduce or stop pericolace if you develop loose stools. Other over the counter solutions such as prune juice, miralax, fiber products, senna, and dulcolax can also be used. If you are taking the pericolace but still have not had a bowel movement in 3 days, start over-the-counter Milk of Magnesia taken twice daily until you have a good result.  Call the office with any concerns.     DRAINS  - Your abdominal drain has been removed.  While the site is healing, change dressings once per day or more frequently if soiled or wet, to keep the site clean.  Once the site has healed, remove dressings completely to leave the site uncovered.      Incisions:   - You may shower and get incisions wet starting 48 hrs after surgery.  - Do not scrub incisions or submerge wounds in a bath, pool, hot tub, etc. for 2 weeks.   - Your incisions were closed with dissolvable suture that will not need to be removed.  Commonly, purple dermabond glue is applied over the top of the sutures.  Avoid using lotions or ointments on your incisions.    - Leave incisions open to air.  Cover with gauze only if needed for comfort or to  "protect clothing from drainage.     Follow-Up:  - Schedule an appointment to be seen by your primary care provider within 7-10 days of discharge for a postoperative checkup  - Follow up with Dr. Mcdonnell as scheduled on 12/13/22 for a postop checkup and to discuss pathology   - Call or return sooner than your regularly scheduled visit if you develop any of the following:  Fever (greater than 101.3F) or flu-like symptoms, uncontrolled pain, uncontrolled nausea or vomiting, as well as increased redness, swelling, or drainage from your wound or any difficulties passing urine.   - Drink 2-3L of water a day to keep your urine clear to light pink in color. Some light blood in your urine can occur but should clear with increased water consumption as instructed.    - Call if blood in urine persists, becomes darker, you see clots or have difficulty urinating    Phone numbers:   - Monday through Friday 8am to 4:30pm: Call [phone] with questions, requests for medication refills, or to schedule or confirm an appointment.  - Nights, weekends, or holidays: call the after hours emergency pager - [phone] and tell the  \"I would like to page the Urology Resident on call.\" Typically, the on-call provider should return your call within 30 minutes.  Please page the on-call provider again if you haven't been contacted as expected.  Rarely, the on-call provider will be unable to promptly return a call due to a hospital emergency.  If you have paged twice and are still not contacted, ask the hospital  to page the \"urology CHIEF-RESIDENT on call\".   - Please note that due to prescribing laws, resident physicians are unable to prescribe narcotics after-hours. If you feel as though you will need a refill of a narcotic pain medication, you will need to call the clinic during business hours OR seek emergency care.  - For emergencies, call 911         Discharge Disposition:     Discharged to home      Attestation: I have " reviewed today's vital signs, notes, medications, labs and imaging.    Angella Cid PA-C  Urology Physician Assistant  Personal Pager: [phone]    Please call Job Code:   [phone] to reach the Urology resident or PA on call - Weekdays  [phone] to reach the Urology resident or PA on call - Weeknights and weekends    November 22, 2022

## 2022-11-22 NOTE — PROGRESS NOTES
"Blood pressure (!) 140/78, pulse 74, temperature 98.9  F (37.2  C), temperature source Oral, resp. rate 16, height 1.778 m (5' 10\"), weight 103 kg (227 lb 1.2 oz), SpO2 94 %.      Goal Outcome Evaluation:     Plan of Care Reviewed With: patient   Overall Patient Progress:  No change     [id number]   Status: NEPHRECTOMY, PARTIAL, ROBOT-ASSISTED, LAPAROSCOPIC  Neuros: A&O x4, CMS intact    Cardiac: WNL   Respiratory: Satting >93% on 1 L NC. Difficulty w/ deep inhalation d/t pain   GI/: Reinier patent w/ adequate UOP. -Bs, +Flatus. LBM 11/21 PTA  Diet/Nausea: Regular diet, no nausea reported. Scope patch on   Skin: 4 abdominal lap sites CDI. SHARONA PHIPPS   LDA: Reinier TABOR JP, PIV x2   Labs: Reviewed   Pain: R sided abdominal pain managed w/ oral oxycodone x2, IV dilaudid x1   Activity: Pt not OOB during shift. Repositioning independently in bed  Plan: Reinier out today 11/22. Continue POC       "

## 2022-11-22 NOTE — PLAN OF CARE
Vital signs:  Temp: 97  F (36.1  C) Temp src: Oral BP: 113/68 Pulse: 74   Resp: 16 SpO2: 96 %   Oxygen Delivery: 1 LPM     Pt arrived from PACU to  around 2155.   Activity: Did not get OOB. Able to turn with Ax1.   Neuros: A&O x4.   Cardiac: WDL.   Respiratory:  On 2L O2 via NC. Capno on. O2 sat mid 90s.   GI/: Hills intact. Hypoactive BS, -flatus.   Diet: Regular.   Lines: Right PIV SL. Left PIV infusing IVMF.   Incisions/Drains: Lap sites x3 with liquid bandage, Lap site x1 with primapore. MOISE intact.   Pain/nausea: Dilaudid given x1. Scopolamine patch behind left ear.  New changes this shift: Pt c/o numbness to right hand, mostly fingertips. CMS intact. Provider aware.     Admitted/transferred from: PACU  2 RN full  skin assessment completed by Florence Rapp RN and Luis Fisher RN.  Skin assessment finding: Lap sites x4. Right MOISE intact. Hills in place. Dryness to bilateral heels. No open wounds noted.  Interventions/actions: Preventative sacral mepilex in place. Frequent weight shifting encouraged to prevent pressure injuries.

## 2022-11-22 NOTE — PROVIDER NOTIFICATION
"Provider notified, \"[id number] [id number] RONALDO Lehman pt c/o numbness to his right hand. Sensation intact, pulse strong and regular. Thanks! Sonia [place] [id number]\"  "

## 2022-11-22 NOTE — PLAN OF CARE
Goal Outcome Evaluation:    OLVIN LAND removed MOISE. IV fluids stopped. Pt. discharged at 1400 to home, was accompanied by significant other, and left with personal belongings. Pt. received complete discharge paperwork and home medications as filled by discharge pharmacy. Pt. was given times of last dose for all discharge medications in writing on discharge medication sheets. Discharge teaching included all medication, pain management, activity restrictions, and signs and symptoms of infection. Pt. to follow up with primary in 1 week. Pt. had no further questions at the time of discharge and no unmet needs were identified.

## 2022-11-22 NOTE — ANESTHESIA POSTPROCEDURE EVALUATION
Patient: Gualberto Lehman    Procedure: Procedure(s):  NEPHRECTOMY, PARTIAL, ROBOT-ASSISTED, LAPAROSCOPIC       Anesthesia Type:  General    Note:  Disposition: Admission   Postop Pain Control: Uneventful            Sign Out: Well controlled pain   PONV:    Neuro/Psych: Uneventful            Sign Out: Acceptable/Baseline neuro status   Airway/Respiratory: Uneventful            Sign Out: Acceptable/Baseline resp. status   CV/Hemodynamics: Uneventful            Sign Out: Acceptable CV status; No obvious hypovolemia; No obvious fluid overload   Other NRE: NONE   DID A NON-ROUTINE EVENT OCCUR?            Last vitals:  Vitals Value Taken Time   /84 11/21/22 2030   Temp 36.4  C (97.5  F) 11/21/22 1915   Pulse 89 11/21/22 2042   Resp 16 11/21/22 2030   SpO2 97 % 11/21/22 2042   Vitals shown include unvalidated device data.    Electronically Signed By: Lee Ann Delgadillo MD  November 21, 2022  8:43 PM

## 2022-11-28 LAB
PATH REPORT.COMMENTS IMP SPEC: ABNORMAL
PATH REPORT.COMMENTS IMP SPEC: ABNORMAL
PATH REPORT.COMMENTS IMP SPEC: YES
PATH REPORT.FINAL DX SPEC: ABNORMAL
PATH REPORT.GROSS SPEC: ABNORMAL
PATH REPORT.INTRAOP OBS SPEC DOC: ABNORMAL
PATH REPORT.MICROSCOPIC SPEC OTHER STN: ABNORMAL
PATH REPORT.RELEVANT HX SPEC: ABNORMAL
PATHOLOGY SYNOPTIC REPORT: ABNORMAL
PHOTO IMAGE: ABNORMAL

## 2022-12-01 ENCOUNTER — TELEPHONE (OUTPATIENT)
Dept: UROLOGY | Facility: CLINIC | Age: 42
End: 2022-12-01

## 2022-12-01 ENCOUNTER — PRE VISIT (OUTPATIENT)
Dept: UROLOGY | Facility: CLINIC | Age: 42
End: 2022-12-01

## 2022-12-01 NOTE — TELEPHONE ENCOUNTER
Patient called to report pain in R abdomen and scant blood in toilet after BM.  Patient has had 2 BMs since that time and has seen no new incidences of blood, and states that he is having no pain or discomfort at this time.  Patient advised to call back if bleeding during BM continues or worsens, or if new symptoms occur.  This author's direct line provided for future questions/concerns.    Frakny Luciano RN  RN Care Coordinator - Urology

## 2022-12-04 NOTE — NURSING NOTE
"Chief Complaint   Patient presents with     Consult       Blood pressure 123/86, pulse 93, height 1.778 m (5' 10\"), weight 99.8 kg (220 lb). Body mass index is 31.57 kg/m .    There is no problem list on file for this patient.      Allergies   Allergen Reactions     No Known Allergies        Current Outpatient Medications   Medication Sig Dispense Refill     levothyroxine (SYNTHROID/LEVOTHROID) 88 MCG tablet Take 88 mcg by mouth       NO ACTIVE MEDICATIONS . 0 0       Social History     Tobacco Use     Smoking status: Never     Smokeless tobacco: Never   Substance Use Topics     Alcohol use: Yes     Comment: sometimes     Drug use: No       Tarun Bautista  10/18/2022  2:41 PM  " Home

## 2022-12-08 ENCOUNTER — DOCUMENTATION ONLY (OUTPATIENT)
Dept: UROLOGY | Facility: CLINIC | Age: 42
End: 2022-12-08

## 2022-12-08 NOTE — PROGRESS NOTES
My chart message sent in response to patient's question about upcoming need to move heavy objects and to perform strenuous exercise.  This author explained to patient that it would not be advisable for him to do so this soon after his surgery.  This author's direct line provided for future questions/concerns.    Franky Luciano, RN  RN Care Coordinator - Urology

## 2022-12-13 ENCOUNTER — VIRTUAL VISIT (OUTPATIENT)
Dept: UROLOGY | Facility: CLINIC | Age: 42
End: 2022-12-13

## 2022-12-13 DIAGNOSIS — C64.1 RENAL CELL CARCINOMA, RIGHT (H): Primary | ICD-10-CM

## 2022-12-13 PROCEDURE — 99024 POSTOP FOLLOW-UP VISIT: CPT | Performed by: UROLOGY

## 2022-12-13 NOTE — NURSING NOTE
Gualberto Lehman  is being evaluated via a billable video visit.      How would you like to obtain your AVS? [employer]  For the video visit, send the invitation by: Text to cell phone: [phone]  Will anyone else be joining your video visit? No Shelby Kocher

## 2022-12-13 NOTE — PROGRESS NOTES
Gualberto Lehman is a 42 year old male who is being evaluated via a billable video visit.      How would you like to obtain your AVS? [employer]  If the video visit is dropped, the invitation should be resent by: Text to cell phone: [phone]  Will anyone else be joining your video visit? No    Video Start Time: 4:36 PM    CHIEF COMPLAINT   It was my pleasure to see Gualberto Lehman who is a 42 year old male for follow-up of renal cell carcinoma.      HPI   Gualberto Lehman is a very pleasant 42 year old male who presents with a history of renal cell carcinoma. Right robotic partial nephrectomy completed 11/21/2022. He has recovered without complication.     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    Histologic Grade (WHO / ISUP)  G2 (nucleoli conspicuous and eosinophilic at 400x magnification, visible but not prominent at 100x magnification)    Tumor Extent  Limited to kidney    Sarcomatoid Features  Not identified    Rhabdoid Features  Not identified    Tumor Necrosis  Not identified    Lymphovascular Invasion  Not identified    MARGINS   Margin Status  All margins negative for invasive carcinoma    REGIONAL LYMPH NODES   Regional Lymph Node Status  Not applicable (no regional lymph nodes submitted or found)    PATHOLOGIC STAGE CLASSIFICATION (pTNM, AJCC 8th Edition)   Reporting of pT, pN, and (when applicable) pM categories is based on information available to the pathologist at the time the report is issued. As per the AJCC (Chapter 1, 8th Ed.) it is the managing physician s responsibility to establish the final pathologic stage based upon all pertinent information, including but potentially not limited to this pathology report.   Primary Tumor (pT)  pT1a    Regional Lymph Nodes (pN)  pN not  assigned (no nodes submitted or found)    ADDITIONAL FINDINGS   Additional Findings in Nonneoplastic Kidney  None identified           Allergies:   Seasonal allergies         Review of Systems:  From intake questionnaire     Skin: negative  Eyes: negative  Ears/Nose/Throat: negative  Respiratory: No shortness of breath, dyspnea on exertion, cough, or hemoptysis  Cardiovascular: No chest pain or palpitations  Gastrointestinal: negative; no nausea/vomiting, constipation or diarrhea  Genitourinary: as per HPI  Musculoskeletal: negative  Neurologic: negative  Psychiatric: negative  Hematologic/Lymphatic/Immunologic: negative  Endocrine: negative         Physical Exam:     Vitals:  No vitals were obtained today due to virtual visit.    Physical Exam   GENERAL: Healthy, alert and no distress  EYES: Eyes grossly normal to inspection.  No discharge or erythema, or obvious scleral/conjunctival abnormalities.  RESP: No audible wheeze, cough, or visible cyanosis.  No visible retractions or increased work of breathing.    SKIN: Visible skin clear. No significant rash, abnormal pigmentation or lesions.  NEURO: Cranial nerves grossly intact.  Mentation and speech appropriate for age.  PSYCH: Mentation appears normal, affect normal/bright, judgement and insight intact, normal speech and appearance well-groomed.         Assessment and Plan:     Assessment: 42 year old male stage pT1a RCC, s/p right robotic partial nephrectomy completed 11/21/2022. We reviewed pathology today and rationale for ongoing surveillance. We also discussed ongoing perioperative restrictions and expectations for recovery.     Plan:  Follow-up 3 months with CT and BMP    Orders  Orders Placed This Encounter   Procedures     CT Renal Mass wo & w Contrast     Basic metabolic panel     Video-Visit Details    Type of service:  Video Visit    Video End Time:4:49 PM    Originating Location (pt. Location): Home    Distant Location (provider location):   On-site    Platform used for Video Visit: [employer]    14 minutes spent on the date of the encounter including direct interaction with the patient, performing chart review, history and exam, documentation and further activities as noted above.    Carlos Mcdonnell MD  Urology  [facility]

## 2022-12-14 PROBLEM — C64.1 RENAL CELL CARCINOMA, RIGHT (H): Status: ACTIVE | Noted: 2022-12-14

## 2022-12-14 PROBLEM — N28.89 RENAL MASS: Status: RESOLVED | Noted: 2022-11-21 | Resolved: 2022-12-14

## 2022-12-14 PROBLEM — N28.89 RIGHT RENAL MASS: Status: RESOLVED | Noted: 2022-10-24 | Resolved: 2022-12-14

## 2022-12-23 DIAGNOSIS — C64.1 RENAL CELL CARCINOMA, RIGHT (H): Primary | ICD-10-CM

## 2023-01-04 ENCOUNTER — VIRTUAL VISIT (OUTPATIENT)
Dept: ONCOLOGY | Facility: CLINIC | Age: 43
End: 2023-01-04
Attending: UROLOGY
Payer: COMMERCIAL

## 2023-01-04 DIAGNOSIS — C64.1 RENAL CELL CARCINOMA, RIGHT (H): ICD-10-CM

## 2023-01-04 DIAGNOSIS — Z80.51 FAMILY HISTORY OF KIDNEY CANCER: Primary | ICD-10-CM

## 2023-01-04 PROCEDURE — 96040 HC GENETIC COUNSELING, EACH 30 MINUTES: CPT | Mod: GT,95 | Performed by: GENETIC COUNSELOR, MS

## 2023-01-04 NOTE — LETTER
1/4/2023         RE: Gualberto Lehman  [address]  [facility] [address]        Dear Colleague,    Thank you for referring your patient, Gualberto Lehman, to the [facility]. Please see a copy of my visit note below.    Gualberto is a 42 year old who is being evaluated via a billable video visit.      Pt is in [place]    How would you like to obtain your AVS? MyChart  If the video visit is dropped, the invitation should be resent by: Send to e-mail at: [email or web address]  Will anyone else be joining your video visit? No    Video-Visit Details    Type of service:  Video Visit   Video Start Time: 11:07am  Video End Time:  12:03pm  Originating Location (pt. Location): Home  Distant Location (provider location):  Off-site  Platform used for Video Visit: PartyLine     1/4/2023    Referring Provider: Carlos Mcdonnell MD    Present for Today's Visit: Gualberto and [facility] genetic counseling student, Deb Reese    Presenting Information:   I met with Gualberto Lehman today for genetic counseling (video visit due to covid19) to discuss his personal and family history of cancer.  He is here today to review this history, cancer screening recommendations, and available genetic testing options.    Personal History:  Gualberto is a 42 year old male. 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      Most recent colonoscopy in 2012 (completed due to diarrhea/rectal bleeding) was normal and follow-up was recommended at age 50 for screening purposes (I let Gualberto know that the general population screening age is now 45). He does not regularly do any other cancer screening at this time.  Gualberto reported no tobacco use, and 0-1 drink per week for alcohol  use.    Family History: Cancer family history and pertinent information reviewed below (Please see scanned pedigree for detailed family history information)    Brother, age 44, recently had part of his colon removed, Gualberto reports that this may be due to colitis (he is unsure of specific reason for the procedure; does not believe it was cancer-related). This brother also had a benign tumor removed from his arm in his late 30's.     Maternal aunt with a history of an unspecified type of cancer diagnosed at an unknown age (Gualberto reports this may have been a breast cancer, but is unsure of specific diagnosis).     Paternal uncle passed in his 50's from brain cancer (unknown type).    Paternal uncle passed in his 50's with a history of a throat cancer. He did have smoking history. This uncle has a son who was recently diagnosed with an aggressive type of kidney cancer.     Paternal uncle passed in his late 40's from lung cancer. Gualberto is unsure if he was a smoker.    There is no known [employer]azi [employer]regation ancestry on either side of his family. There is no reported consanguinity.    Discussion:    Gualberto's personal and family history of kidney is suggestive of a hereditary cancer syndrome.    We reviewed the features of sporadic, familial, and hereditary cancers. In looking at Gualberto's family history, it is possible that a cancer susceptibility gene is present due to his early-onset kidney cancer history as well as his paternal cousin's kidney cancer history.    We discussed the natural history and genetics of hereditary cancers. A detailed handout regarding the information we discussed will be sent to Gualberto via [employer]. Topics included: inheritance pattern, cancer risks, cancer screening recommendations, and also risks, benefits and limitations of testing.    We reviewed some of the hereditary renal cancer syndromes including von Hippel Lindau disease and BAP1-tumor predisposition syndrome. I explained that there are  additional genes associated with kidney cancer risk including the Kuo syndrome and other genes.     Von Hippel Lindau disease is caused by pathogenic (harmful) mutations in the VHL gene. This condition is associated with increased risk for kidney cancer, hemangioblastomas of the brain, spine, and retina, adrenal tumors (pheochroocytoma), inner ear tumors (enolymphatic sac tumors), pancreatic neuroendocrine tumors, and renal and pancreatic cysts.     BAP1-tumor predisposition syndrome is caused by mutations in the BAP1 gene. This condition is associated with increased risk for kidney cancer, cutaneous melanoma, uveal melanoma, atypical spitz tumors, and mesothelioma.     Based on his personal history, Gualberto meets current National Comprehensive Cancer Network (NCCN) criteria for genetic testing of hereditary renal cancer syndrome genes.      We discussed that there are additional genes that could cause increased risk for renal and related cancers. As many of these genes present with overlapping features in a family and accurate cancer risk cannot always be established based upon the pedigree analysis alone, it would be reasonable for Gualberto to consider panel genetic testing to analyze multiple genes at once.    We reviewed genetic testing options given Gualberto's personal and family history including targeted and expanded options. After our discussion, Gualberto opted to proceed with genetic testing via the Renal/Urinary Tract Cancers panel + the Common Hereditary Cancers panel through [facility].   Genetic testing is available for 59 genes associated with cancers of the breast, ovary, uterus, prostate and gastrointestinal system: [facility] Common Hereditary Cancers panel (APC, REHAN, AXIN2, BARD1, BAP1, BMPR1A, BRCA1, BRCA2, BRIP1, CDC73, CDH1, CDK4, CDKN1C, CDKN2A, CHEK2, CTNNA1, DICER1, DIS3L2, EPCAM, FH, FLCN, GREM1, GPC3, HOXB13, KIT, MEN1, MET, MITF, MLH1, MSH2, MSH3, MSH6, MUTYH, NBN, NF1, NTHL1, PALB2, PDGFRA, PMS2, POLD1,  POLE, PTEN, RAD50, RAD51C, RAD51D, REST, SDHA, SDHB, SDHC, SDHD, SMAD4, SMARCA4, SMARCB1, STK11, TP53,TSC1, TSC2, VHL, WT1).      Consent was obtained over videowith no witness required due to the current covid19 global pandemic.    Medical Management: For Gualberto, we reviewed that the information from genetic testing may determine:    additional cancer screening for which Gualberto may qualify (i.e. more frequent colonoscopies, more frequent dermatologic exams, follow-up with other specialists, etc.),    and targeted chemotherapies for Gualberto if he were to develop certain cancers in the future (i.e. immunotherapy for individuals with Kuo syndrome, PARP inhibitors, etc.).     These recommendations will be discussed in detail once genetic testing is completed.     Gualberto will schedule a lab only appointment at any [facility] clinic at her earliest convenience.     Plan:  1) Today Gualberto elected to proceed with Renal/Urinary Tract Cancers panel + Common Hereditary Cancers panel genetic testing (59 genes) through [facility].  2) This information should be available in approximately 3 weeks from the time the lab receives his sample.  3) Gualberto will be contacted by our scheduling department to set up a result disclosure appointment.     Brittney Vera MS, [facility]  Licensed, Certified Genetic Counselor  [facility]  [email or web address]

## 2023-01-04 NOTE — PATIENT INSTRUCTIONS
Assessing Cancer Risk  Only about 5-10% of cancers are thought to be due to an inherited cancer susceptibility gene.    These families often have:  Several people with the same or related types of cancer  Cancers diagnosed at a young age (before age 50)  Individuals with more than one primary cancer  Multiple generations of the family affected with cancer    We each inherit two copies of every gene in our bodies: one from our mother and one from our father.  Each gene has a specific job to do.  When a gene has a mistake or  mutation  in it, it does not work like it should.     Von Hippel-Lindau Disease (VHL)  Currently one gene is known to cause Von Hippel-Lindau disease: VHL.  A single mutation in the VHL gene increases the risk for kidney cysts and cancer, pheochromocytoma, and neuroendocrine tumors of the pancreas.  Pheochromocytomas are usually non-cancerous tumors that occur on the adrenal glands, which sit on top of the kidneys.  Renal cell carcinoma (RCC) of the kidneys is cancerous and occurs in 70% of people with VHL by age 60.    A main feature of VHL is hemangioblastomas--tumor-like knots of the blood vessels--in the brain, spine, and retina of the eye.  This can cause vision loss.  Some people with VHL will have hearing loss due to tumors in the inner ear.  Men will often have benign cysts of the epididymis.            BAP1 Tumor Predisposition Syndrome  BAP1 Tumor Predisposition Syndrome is caused by mutations in the BAP1 gene.  As this is a rare syndrome, there are currently no lifetime cancer risk estimates for the BAP1-associated cancers. As new information becomes available, more concrete lifetime cancer risks may also become available. Associated cancers and tumors include: atypical Spitz tumors,  uveal (eye) melanoma, mesothelioma,  cutaneous (skin) melanoma, kidney cancer (typically clear cell) and possibly other cancers (including basal cell carcinoma, breast, thyroid, meningioma, and  neuroendocrine tumors). Currently there are no estimates regarding the prevalence or lifetime risk of these cancers.    Hereditary Leiomyomatosis and Renal Cell Cancer (HLRCC)  HLRCC is caused by mutations in the FH gene. Individuals with HLRCC typically develop tumors of the smooth muscle and skin, called leiomyomas. About 76% of individuals with HLRCC with develop these skin findings. Women can also develop leiomyomas of the uterus. Mutations in the FH gene also lead to higher risk to develop kidney cancer, typically papillary renal cancer type 2. Some data suggests that FH gene mutations may also lead to risk for PGLs/PCCs.    Scottie-Dorcas Carroll Syndrome  Lxmh-Bpon-Lnas syndrome (BHDS) is caused by mutations in the FLCN gene and affects the skin, lungs, and kidneys.  Individuals can have benign skin tumors, particularly in the head and neck region.  Individuals with BHDS can also experience pneumothorax (lung collapse) due to cysts that develop on the lungs.  Renal tumors (benign or malignant) can be seen in individuals with BHDS.      Inheritance   It is important to remember that inheriting a mutation does not mean a person will develop cancer, but it does significantly increase his or her risk above the general population risk.    VHL, BAP1, FH and FLCN mutations are inherited in an autosomal dominant pattern.  This means that if a parent has a mutation, each of his or her children will have a 50% chance of inheriting that same mutation.  Therefore, each child--male or female--would have a 50% chance of being at increased risk for developing cancer and the associated benign tumors.      Image obtained from Genetics Home Reference, 2013    Genetic Testing  Genetic testing involves a blood test and will look at the genetic information in the VHL, BAP1, FH, and FLCN genes for any harmful mutations that are associated with increased cancer risk.  If possible, it is recommended that the person(s) who has had cancer or  the characteristic tumors be tested first before other family members.  That person will give us the most useful information about whether or not the VHL gene is associated with the cancer in the family.    Results  There are three possible results of genetic testing:  Positive--a harmful mutation was identified   Negative--no mutation was identified   Variant of unknown significance--a variation in VHL, BAP1, FH, or FLCN was identified, but it is unclear how this impacts cancer risk in the family    Advantages and Disadvantages  There are advantages and disadvantages to genetic testing.    Advantages  May clarify your cancer risk  Can help you make medical decisions  May explain the cancers in your family  May give useful information to your family members (if you share your results)    Disadvantages  Possible negative emotional impact of learning about inherited cancer risk  Uncertainty in interpreting a negative test result in some situations  Possible genetic discrimination concerns (see below)    Genetic Information Nondiscrimination Act (VIN)  VIN is a federal law that protects individuals from health insurance or employment discrimination based on a genetic test result alone.  Although rare, there are currently no legal protections in terms of life insurance, long term care, or disability insurances.  Visit the National Human Genome Research [place] genome.gov/32341983 to learn more.    Reducing Cancer Risk  The following screenings options are subject to change as guidelines may be updated. These options should be discussed with an individual's primary care provider to determine at what age to begin screening, what screening is appropriate, and if additional screening is necessary based on personal/family history factors.     Von Hippel-Lindau disease screening1,2:  Retinal Angioma:  Annual ophthalmology exam starting in infancy  Central nervous system hemangioblastomas: MRI of the head and spine  starting at age 11; repeated every 1-3 years  Renal and pancreatic tumors:  Annual ultrasound or MRI of abdomen starting at age 16  Pheochromocytoma:   Annual CT of abdomen starting at age 18 or earlier  Annual 24 hour urine catecholamines starting at age 8  Annual Plasma normetanephrine starting at age 8  Inner ear:    CT and MRI of internal auditory canals at onset of symptoms; repeat as needed  Audiology function tests    BAP1 cancer screening3:  Annual dilated eye exam, ideally with an ocular oncologist - begin at age 11  Annual dermatologic exams - begin at age 20  Self skin exams following ABCDE melanoma characteristics  Regular use of sun protection  Consideration of annual abdominal ultrasound and urine analysis - age at initiation of screening unknown but could begin at age of diagnosis or based on family history  Consider abdominal MRI every two years (with possible inclusion of pleura and peritoneum) - age at initiation of screening unknown but could begin at age of diagnosis or based on family history  Consider annual physical with particular attention to symptoms of mesothelioma - age at initiation of screening unknown but could begin at age of diagnosis or based on family history.    HLRCC screening:  Uterine leiomyomas: Annual gynecological exams  Cutaneous leiomyomas: Dermatological exams every 1-2 years  Renal cancer surveillance: Annual MRI of the abdomen with contrast   Blood work    Rzwv-Lvmk-Khqe screening4-6:  Individuals with BHDS should undergo regular abdominal imaging due to the risk for developing these renal tumors and cancer risk, including annual MRI imaging of the kidneys.      Detailed dermatologic examination and punch biopsy of suspected cutaneous lesions, with consideration of full body skin examination.   Baseline high resolution chest CT for visualization of pulmonary cysts is recommended for individuals diagnosed with BHDS, with monitoring at regular intervals.  Exposure to  large ambient pressure differences could precipitate pneumothorax, and smoking is a risk factor which should be avoided.      Questions to Think About Regarding Genetic Testing  What effect will the test result have on me and my relationship with my family members if I have an inherited gene mutation?  If I don t have a gene mutation?  Should I share my test results, and how will my family react to this news, which may also affect them?  Are my children ready to learn new information that may one day affect their own health?    Resources  VHL Family [place] [email or web address]   Kidney Cancer Association [email or web address]   American Cancer Society (ACS) cancer.org   National Cancer [place] (NCI) cancer.gov     Please call us if you have any questions or concerns.   Cancer Risk Management Program [phone]-C[facility] ([phone])      References  Dada BOSTON, Rk RADFORD, Butch DAVILA. von Hippel-Lindau disease: a clinical and scientific review. Eur J Hum Charu. 2011;19:617-23.  Max RR, Isaiah GM, Franko M, Gonzales EY, Ruddy SK, Juan Diego WM, Deandre EH. von Hippel-Lindau disease. Lancet. 2003;361:2059-67  EVONNE Barron., SHARONA Loya., Katelyn, C. M., & Brandy?Abdul, M. H. (2016). Comprehensive review of BAP1 tumor predisposition syndrome with report of two new cases. Clinical genetics, 89(3), 285-294.  CHARLIE Kinney (2014). Wqcx-Xhfi-TcbÃ  Syndrome. In [employer][Internet]. [employer], [place].  Shahida, RONALDO WORTHINGTON., van Delia, M. A., & VANIA Hernadez (2009). Margaret garcia, butch DAVILA, Marika S, Sandhya M, Sebastian TV, Pietro J, Dada boston;  BHD Consortium. Mvaq-Msey-Lvzé syndrome: diagnosis and management. Lancet Oncol, 10, 1199-206.  SADAF Carpenter., YAMIL Lim, YAMIL Hubbard B., SHARLENE Stewart., SHARLENE Connelly, & DAVID Barreto (2017). Ubmt-Hqip-Rrvé syndrome: a case report and a review of the literature.  clinical respiratory journal, 4(1), 2894444.

## 2023-01-04 NOTE — PROGRESS NOTES
Gualberto is a 42 year old who is being evaluated via a billable video visit.      Pt is in [place]    How would you like to obtain your AVS? MyChart  If the video visit is dropped, the invitation should be resent by: Send to e-mail at: [email or web address]  Will anyone else be joining your video visit? No    Video-Visit Details    Type of service:  Video Visit   Video Start Time: 11:07am  Video End Time:  12:03pm  Originating Location (pt. Location): Home  Distant Location (provider location):  Off-site  Platform used for Video Visit: [employer]     1/4/2023    Referring Provider: Carlos Mcdonnell MD    Present for Today's Visit: Gualberto and [facility] genetic counseling student, Deb Reese    Presenting Information:   I met with Gualberto Lehman today for genetic counseling (video visit due to covid19) to discuss his personal and family history of cancer.  He is here today to review this history, cancer screening recommendations, and available genetic testing options.    Personal History:  Gualberto is a 42 year old male. 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      Most recent colonoscopy in 2012 (completed due to diarrhea/rectal bleeding) was normal and follow-up was recommended at age 50 for screening purposes (I let Gualberto know that the general population screening age is now 45). He does not regularly do any other cancer screening at this time.  Gualberto reported no tobacco use, and 0-1 drink per week for alcohol use.    Family History: Cancer family history and pertinent information reviewed below (Please see scanned pedigree for detailed family history information)    Brother, age 44, recently had part of his colon removed, Gualberto reports that this may be due to colitis (he is unsure of  specific reason for the procedure; does not believe it was cancer-related). This brother also had a benign tumor removed from his arm in his late 30's.     Maternal aunt with a history of an unspecified type of cancer diagnosed at an unknown age (Gualberto reports this may have been a breast cancer, but is unsure of specific diagnosis).     Paternal uncle passed in his 50's from brain cancer (unknown type).    Paternal uncle passed in his 50's with a history of a throat cancer. He did have smoking history. This uncle has a son who was recently diagnosed with an aggressive type of kidney cancer.     Paternal uncle passed in his late 40's from lung cancer. Gualberto is unsure if he was a smoker.    There is no known Ashkenazi Orthodoxy ancestry on either side of his family. There is no reported consanguinity.    Discussion:    Gualberto's personal and family history of kidney is suggestive of a hereditary cancer syndrome.    We reviewed the features of sporadic, familial, and hereditary cancers. In looking at Gualberto's family history, it is possible that a cancer susceptibility gene is present due to his early-onset kidney cancer history as well as his paternal cousin's kidney cancer history.    We discussed the natural history and genetics of hereditary cancers. A detailed handout regarding the information we discussed will be sent to Gualberto via [employer]. Topics included: inheritance pattern, cancer risks, cancer screening recommendations, and also risks, benefits and limitations of testing.    We reviewed some of the hereditary renal cancer syndromes including von Hippel Lindau disease and BAP1-tumor predisposition syndrome. I explained that there are additional genes associated with kidney cancer risk including the Kuo syndrome and other genes.     Von Hippel Lindau disease is caused by pathogenic (harmful) mutations in the VHL gene. This condition is associated with increased risk for kidney cancer, hemangioblastomas of the brain,  spine, and retina, adrenal tumors (pheochroocytoma), inner ear tumors (enolymphatic sac tumors), pancreatic neuroendocrine tumors, and renal and pancreatic cysts.     BAP1-tumor predisposition syndrome is caused by mutations in the BAP1 gene. This condition is associated with increased risk for kidney cancer, cutaneous melanoma, uveal melanoma, atypical spitz tumors, and mesothelioma.     Based on his personal history, Gualberto meets current National Comprehensive Cancer Network (NCCN) criteria for genetic testing of hereditary renal cancer syndrome genes.      We discussed that there are additional genes that could cause increased risk for renal and related cancers. As many of these genes present with overlapping features in a family and accurate cancer risk cannot always be established based upon the pedigree analysis alone, it would be reasonable for Gualberto to consider panel genetic testing to analyze multiple genes at once.    We reviewed genetic testing options given Gualberto's personal and family history including targeted and expanded options. After our discussion, Gualberto opted to proceed with genetic testing via the Renal/Urinary Tract Cancers panel + the Common Hereditary Cancers panel through [facility].   Genetic testing is available for 59 genes associated with cancers of the breast, ovary, uterus, prostate and gastrointestinal system: [facility] Common Hereditary Cancers panel (APC, REHAN, AXIN2, BARD1, BAP1, BMPR1A, BRCA1, BRCA2, BRIP1, CDC73, CDH1, CDK4, CDKN1C, CDKN2A, CHEK2, CTNNA1, DICER1, DIS3L2, EPCAM, FH, FLCN, GREM1, GPC3, HOXB13, KIT, MEN1, MET, MITF, MLH1, MSH2, MSH3, MSH6, MUTYH, NBN, NF1, NTHL1, PALB2, PDGFRA, PMS2, POLD1, POLE, PTEN, RAD50, RAD51C, RAD51D, REST, SDHA, SDHB, SDHC, SDHD, SMAD4, SMARCA4, SMARCB1, STK11, TP53,TSC1, TSC2, VHL, WT1).      Consent was obtained over videowith no witness required due to the current covid19 global pandemic.    Medical Management: For Gualberto, we reviewed that the  information from genetic testing may determine:    additional cancer screening for which Gualberto may qualify (i.e. more frequent colonoscopies, more frequent dermatologic exams, follow-up with other specialists, etc.),    and targeted chemotherapies for Gualberto if he were to develop certain cancers in the future (i.e. immunotherapy for individuals with Kuo syndrome, PARP inhibitors, etc.).     These recommendations will be discussed in detail once genetic testing is completed.     Gualberto will schedule a lab only appointment at any [facility] at her earliest convenience.     Plan:  1) Today Gualberto elected to proceed with Renal/Urinary Tract Cancers panel + Common Hereditary Cancers panel genetic testing (59 genes) through [facility].  2) This information should be available in approximately 3 weeks from the time the lab receives his sample.  3) Gualberto will be contacted by our scheduling department to set up a result disclosure appointment.     Brittney Vera MS, [facility]  Licensed, Certified Genetic Counselor  [facility]  [email or web address]

## 2023-01-16 ENCOUNTER — TELEPHONE (OUTPATIENT)
Dept: ONCOLOGY | Facility: CLINIC | Age: 43
End: 2023-01-16

## 2023-01-20 ENCOUNTER — LAB (OUTPATIENT)
Dept: LAB | Facility: CLINIC | Age: 43
End: 2023-01-20
Payer: COMMERCIAL

## 2023-01-20 DIAGNOSIS — Z80.51 FAMILY HISTORY OF KIDNEY CANCER: ICD-10-CM

## 2023-01-20 DIAGNOSIS — C64.1 RENAL CELL CARCINOMA, RIGHT (H): ICD-10-CM

## 2023-01-20 PROCEDURE — 99000 SPECIMEN HANDLING OFFICE-LAB: CPT

## 2023-01-20 PROCEDURE — 36415 COLL VENOUS BLD VENIPUNCTURE: CPT

## 2023-01-31 LAB — SCANNED LAB RESULT: ABNORMAL

## 2023-02-07 NOTE — PROGRESS NOTES
"Video-Visit Details    Type of service:  Video Visit  Video Start Time (time video started): 8:00am  Video End Time (time video stopped): 8:15am  Originating Location (pt. Location): Home  Distant Location (provider location):  Off-site  Mode of Communication:  Video Conference via [employer]    2/8/2023    Referring Provider: Dr. Mcdonnell    Presenting Information:   I met with Gualberto via video visit today to discuss his genetic testing results. His blood was drawn on 1/20/2023 and we ordered Common Hereditary Cancers panel + Renal/Urinary Tract Cancers panel testing from [facility]. This testing was done because of his personal and family history of kidney cancer.     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 We discussed that this mutation is associated with an increased risk for prostate cancer. We discussed the impact of this testing on Gualberto in detail.     Of note, a variant of uncertain significance (VUS) was also identified in the BARD1 gene called c.1672T>C (p.[id number]). This VUS is explained in further detail at the bottom of this letter.     Of note, Gualberto is negative for pathogenic (harmful) mutations in the APC, REHAN, AXIN2, BARD1, BAP1, BMPR1A, BRCA1, BRCA2, BRIP1, CDC73, CDH1, CDK4, CDKN1C, CDKN2A, CHEK2, CTNNA1, DICER1, DIS3L2, EPCAM, FH, FLCN, GREM1, GPC3, HOXB13, KIT, MEN1, MET, MITF, MLH1, MSH2, MSH3, MSH6, MUTYH, NBN, NF1, NTHL1, PALB2, PDGFRA, PMS2, POLD1, POLE, PTEN, RAD50, RAD51C, RAD51D, REST, SDHA, SDHB, SDHC, SDHD, SMAD4, SMARCA4, SMARCB1, STK11, TP53,TSC1, TSC2, VHL, WT1 genes. No pathogenic (harmful)  mutations were identified in any of the remaining 58 genes Please see copy of scanned test report for complete details regarding testing methodology/limitations.    A copy of the test report can be found in the Laboratory tab, dated 1/20/2023, and named \"LABORATORY " "MISCELLANEOUS ORDER\". The report is scanned in as a linked document.    Cancer Risks:  We reviewed that mutations in the HOXB13 gene are associated with increased prostate cancer risk. The p.G84E mutation in HOXB13 has been reported as a  mutation in [place] and other [place] countries (Iggy TABOR et al, Eur Urol, 2014, 65:169-176).   ? Studies suggest the risk of prostate cancer with this particular mutation, p.G84E, to be as high as 30-60% (approximately 3-4 times the general population risk).    ? Of note, a recent study by Lizbeth T et al (Eur Urol, 2018) suggests that the lifetime prostate cancer risk may be influenced by the family history. This means individuals with a significant family history of prostate cancer may have a higher lifetime prostate cancer risk as compared to those with no family history of prostate cancer. Per this study, the average lifetime prostate cancer risk is estimated to be 62% for men with a HOXB13 mutation and no significant family history of prostate cancer. More studies are needed, though, to clarify this risk.  ? Other cancer risks associated with a HOXB13 mutation, particularly female cancer risks, are unknown at this time.      Cancer Screening and Prevention:  There are currently no published prostate cancer screening guidelines specific to men who carry a HOXB13 mutation.   ? The American Cancer Society currently recommends that individuals at high risk for prostate cancer discuss prostate cancer screening with a physician. Since the risk of prostate cancer is increased with a HOXB13 mutation, screening could be considered as early as age 40. This may vary based on family history, though.  ? Screening typically involves a PSA blood test and can also include a digital rectal exam (DUDLEY). Final screening recommendations should be made by each individual's physician.    Other screening based on Gualberto's personal and family history:    Due to Gualberto's personal history of " renal cancer, other close relatives remain at slightly increased risk for renal cancer. While several screening modalities for renal cancer exist (e.g., urinalysis, ultrasound, etc), clear screening recommendations/strategies do not exist for individuals with family history of renal cancer. That being said, Gualberto's relatives should discuss this family history, signs and symptoms of renal cancer, and possible screening options with their providers, as they may have individualized recommendations.    Other population cancer screening options, such as those recommended by the American Cancer Society and the National Comprehensive Cancer Network (NCCN), are also appropriate for Gualberto and his family. These screening recommendations may change if there are changes to Gualberto's personal and/or family history. Final screening recommendations should be made by each individual's managing physician.     We discussed that Gualberto could participate in our Cancer Risk Management Program in which our nursing specialist provides an individual screening plan and assists with medical management. A referral was placed to see LUKE Cisse for this service.    Of note, the above information is based on our current understanding of Gualberto's genetic findings. Gualberto is encouraged to reach out to me regularly regarding any pertinent updates to his personal and/or family history of cancer, as our understanding of the genetic findings in his family may change over time.     Implications for Family Members:  We reviewed that mutations in the HOXB13 gene are inherited in an autosomal dominant pattern. This means that each of Gualberto's children have a 50% chance of inheriting the same mutation. Likewise, each of his siblings has a 50% risk of having the same mutation. Extended relatives may also carry this mutation, and he is encouraged to share this information with his family members on both sides of the family. I am happy to help his  relatives connect with a genetic counselor in their area if they would like to discuss testing.    Additional Genetic Testing Result: Variant of Uncertain Significance (VUS)  Gualberto was also found to have a variant of uncertain significance (VUS) in the BARD1 gene. This variant is called c.1672T>C (p.[id number]).  No other variants or mutations were found in the BARD1 gene. Given the uncertain significance of this result, medical management decisions should NOT be made based on this test result alone.    VUS Interpretation:  We discussed several different interpretations of this inconclusive test result. It is not clear if this variant in the BARD1 gene is associated with increased cancer risk.  1. This variant may be a benign change that does not increase cancer risk.  2. This variant may be a harmful mutation that causes increased risk for breast cancer.    BARD1 c.1672T>C (p.[id number])   Harmful mutations in the BARD1 gene are associated with increased risk for female breast cancer. We discussed that medical management decisions are NOT recommended for individuals with a VUS result.    The laboratory is working to determine if this variant is harmful or benign, and they will contact me if it is reclassified.     We discussed that the increased risk allele in the HOXB13 gene and this BARD1 variant of uncertain significance do not explain Gualberto's personal/family history of cancer. One explanation for Gualberto's unexplained personal/family history of cancer may be that there is a different gene or combination of genes and environment that are associated with the cancers in this family. It is also important to consider that other relatives with cancer history may have had a mutation in one of the genes tested, and he did not inherit it.  There is also a small possibility that there is a mutation in one of these genes, and we could not find it with our current testing methods.       Gualberto's relatives may also carry this  "VUS. Because it is unclear what, if any, risk is associated with this variant, clinical genetic testing for this BARD1 variant alone is not recommended for relatives.      Plan:  1.  A copy of his results will be released to his email from [employer], and a copy will be sent in the mail per Gualberto's request.  2.  I will provide a \"Dear Relative\" letter for Gualberto to share with his family members.  3.  He plans to follow up with his primary care providers and urology teams for ongoing care and cancer screening.  4. I will attempt to contact Gualberto if the laboratory informs me that this VUS has been reclassified.  This may change screening and testing recommendations for Gualberto and his relatives.    Brittney Vera MS, [facility]  Licensed, Certified Genetic Counselor  [facility]  [email or web address]          "

## 2023-02-08 ENCOUNTER — VIRTUAL VISIT (OUTPATIENT)
Dept: ONCOLOGY | Facility: CLINIC | Age: 43
End: 2023-02-08
Attending: GENETIC COUNSELOR, MS
Payer: COMMERCIAL

## 2023-02-08 DIAGNOSIS — C64.1 RENAL CELL CARCINOMA, RIGHT (H): ICD-10-CM

## 2023-02-08 DIAGNOSIS — Z15.03 MUTATION IN HOXB13 GENE: Primary | ICD-10-CM

## 2023-02-08 DIAGNOSIS — Z80.51 FAMILY HISTORY OF KIDNEY CANCER: ICD-10-CM

## 2023-02-08 PROCEDURE — 999N000069 HC STATISTIC GENETIC COUNSELING, < 16 MIN: Mod: GT,95 | Performed by: GENETIC COUNSELOR, MS

## 2023-02-08 NOTE — LETTER
Cancer Risk Management  Program [facility]  [facility]  Mailing Address  Cancer Risk Management Program  [address]  [place] [address]    New patient appointments  [phone]  February 8, 2023    Gualberto SKAGGS Dominik  [address]   [facility] [address]    Dear Gualberto,    It was a pleasure speaking with you on 2/8/2023.  Here is a copy of the progress note from our discussion. If you have any additional questions, please feel free to call.    Referring Provider: Dr. Mcdonnell    Presenting Information:   I met with Gualberto via video visit today to discuss his genetic testing results. His blood was drawn on 1/20/2023 and we ordered Common Hereditary Cancers panel + Renal/Urinary Tract Cancers panel testing from [facility]. This testing was done because of his personal and family history of kidney cancer.     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 Specifically his mutation is called c.251G>A (also known as Sjr49Emy). We discussed that this mutation is associated with an increased risk for prostate cancer. We discussed the impact of this testing on Gualberto in detail.     Of note, a variant of uncertain significance (VUS) was also identified in the BARD1 gene called c.1672T>C (p.Wsq468Rgt). This VUS is explained in further detail at the bottom of this letter.     Of note, Gualberto is negative for pathogenic (harmful) mutations in the APC, REHAN, AXIN2, BARD1, BAP1, BMPR1A, BRCA1, BRCA2, BRIP1, CDC73, CDH1, CDK4, CDKN1C, CDKN2A, CHEK2, CTNNA1, DICER1, DIS3L2, EPCAM, FH, FLCN, GREM1, GPC3, HOXB13, KIT, MEN1, MET, MITF, MLH1, MSH2, MSH3, MSH6, MUTYH, NBN, NF1, NTHL1, PALB2, PDGFRA, PMS2, POLD1, POLE, PTEN, RAD50, RAD51C, RAD51D, REST, SDHA,  "SDHB, SDHC, SDHD, SMAD4, SMARCA4, SMARCB1, STK11, TP53,TSC1, TSC2, VHL, WT1 genes. No pathogenic (harmful)  mutations were identified in any of the remaining 58 genes Please see copy of scanned test report for complete details regarding testing methodology/limitations.    A copy of the test report can be found in the Laboratory tab, dated 1/20/2023, and named \"LABORATORY MISCELLANEOUS ORDER\". The report is scanned in as a linked document.    Cancer Risks:  We reviewed that mutations in the HOXB13 gene are associated with increased prostate cancer risk. The p.G84E mutation in HOXB13 has been reported as a  mutation in [place] and other [place] countries (Iggy R et al, Eur Urol, 2014, 65:169-176).   ? Studies suggest the risk of prostate cancer with this particular mutation, p.G84E, to be as high as 30-60% (approximately 3-4 times the general population risk).    ? Of note, a recent study by Lizbeth T et al (Eur Urol, 2018) suggests that the lifetime prostate cancer risk may be influenced by the family history. This means individuals with a significant family history of prostate cancer may have a higher lifetime prostate cancer risk as compared to those with no family history of prostate cancer. Per this study, the average lifetime prostate cancer risk is estimated to be 62% for men with a HOXB13 mutation and no significant family history of prostate cancer. More studies are needed, though, to clarify this risk.  ? Other cancer risks associated with a HOXB13 mutation, particularly female cancer risks, are unknown at this time.      Cancer Screening and Prevention:  There are currently no published prostate cancer screening guidelines specific to men who carry a HOXB13 mutation.   ? The American Cancer Society currently recommends that individuals at high risk for prostate cancer discuss prostate cancer screening with a physician. Since the risk of prostate cancer is increased with a HOXB13 mutation, " screening could be considered as early as age 40. This may vary based on family history, though.  ? Screening typically involves a PSA blood test and can also include a digital rectal exam (DUDLEY). Final screening recommendations should be made by each individual's physician.    Other screening based on Gualberto's personal and family history:    Due to Gualberto's personal history of renal cancer, other close relatives remain at slightly increased risk for renal cancer. While several screening modalities for renal cancer exist (e.g., urinalysis, ultrasound, etc), clear screening recommendations/strategies do not exist for individuals with family history of renal cancer. That being said, Gualberto's relatives should discuss this family history, signs and symptoms of renal cancer, and possible screening options with their providers, as they may have individualized recommendations.    Other population cancer screening options, such as those recommended by the American Cancer Society and the National Comprehensive Cancer Network (NCCN), are also appropriate for Gualberto and his family. These screening recommendations may change if there are changes to Gualberto's personal and/or family history. Final screening recommendations should be made by each individual's managing physician.     We discussed that Gualberto could participate in our Cancer Risk Management Program in which our nursing specialist provides an individual screening plan and assists with medical management. A referral was placed to see LUKE Cisse for this service.    Of note, the above information is based on our current understanding of Gualberto's genetic findings. Gualberto is encouraged to reach out to me regularly regarding any pertinent updates to his personal and/or family history of cancer, as our understanding of the genetic findings in his family may change over time.     Implications for Family Members:  We reviewed that mutations in the HOXB13 gene are inherited  in an autosomal dominant pattern. This means that each of Gualberto's children have a 50% chance of inheriting the same mutation. Likewise, each of his siblings has a 50% risk of having the same mutation. Extended relatives may also carry this mutation, and he is encouraged to share this information with his family members on both sides of the family. I am happy to help his relatives connect with a genetic counselor in their area if they would like to discuss testing.    Additional Genetic Testing Result: Variant of Uncertain Significance (VUS)  Gualberto was also found to have a variant of uncertain significance (VUS) in the BARD1 gene. This variant is called c.1672T>C (p.Swh034Bum).  No other variants or mutations were found in the BARD1 gene. Given the uncertain significance of this result, medical management decisions should NOT be made based on this test result alone.    VUS Interpretation:  We discussed several different interpretations of this inconclusive test result. It is not clear if this variant in the BARD1 gene is associated with increased cancer risk.  1. This variant may be a benign change that does not increase cancer risk.  2. This variant may be a harmful mutation that causes increased risk for breast cancer.    BARD1 c.1672T>C (p.Tkp214Fnk)   Harmful mutations in the BARD1 gene are associated with increased risk for female breast cancer. We discussed that medical management decisions are NOT recommended for individuals with a VUS result.    The laboratory is working to determine if this variant is harmful or benign, and they will contact me if it is reclassified.     We discussed that the increased risk allele in the HOXB13 gene and this BARD1 variant of uncertain significance do not explain Gualberto's personal/family history of cancer. One explanation for Gualberto's unexplained personal/family history of cancer may be that there is a different gene or combination of genes and environment that are associated  "with the cancers in this family. It is also important to consider that other relatives with cancer history may have had a mutation in one of the genes tested, and he did not inherit it.  There is also a small possibility that there is a mutation in one of these genes, and we could not find it with our current testing methods.       Gualberto's relatives may also carry this VUS. Because it is unclear what, if any, risk is associated with this variant, clinical genetic testing for this BARD1 variant alone is not recommended for relatives.      Plan:  1.  A copy of his results will be released to his email from [facility], and a copy will be sent in the mail per Gualberto's request.  2.  I will provide a \"Dear Relative\" letter for Gualberto to share with his family members.  3.  He plans to follow up with his primary care providers and urology teams for ongoing care and cancer screening.  4. I will attempt to contact Gualberto if the laboratory informs me that this VUS has been reclassified.  This may change screening and testing recommendations for Gualberto and his relatives.    Brittney Vera MS, [facility]  Licensed, Certified Genetic Counselor  [facility]  [email or web address]    2/8/2023    Dear Relative,     The purpose of this letter is to inform you that your relative recently underwent genetic counseling and genetic testing due to the persona/family history of cancer. The testing done through [facility] identified a mutation in the HOXB13 gene. Specifically, the mutation is called c.251G>A (p.Cfk13Mrc). The accession number linked to your relative's test report is [id number].    A mutation (or change in the genetic code) causes a specific gene to stop working properly. Ultimately, men who have a mutation in this HOXB13 gene have an increased risk for prostate cancer.  The medical literature reports the risk of prostate cancer with this mutation, p.Xgm88Oro, to be as high as 3-4 times population risk.    Currently, " there are no published prostate cancer screening guidelines for men with this mutation. The American Cancer Society currently recommends that individuals at high risk for prostate cancer discuss prostate cancer screening with a physician. Since the risk of prostate cancer is increased with a HOXB13 mutation, screening could be considered as early as age 40. This may vary based on family history.    It is important to note that both men and women have a 50% chance of passing this mutation on to each of their children.  Although cancer risks for women have not been described at this time for the HOXB13 gene, genetic testing for this mutation women could help clarify risk for their sons.    I understand that this may be surprising, unexpected, and even scary news. Because this mutation has been identified in your family, you are at risk for having the same mutation. As mentioned earlier, your children may also be at risk for carrying this familial mutation.     Scheduling a genetic counseling appointment does not mean you have to undergo genetic testing. The decision to pursue such testing is a very personal one that is discussed in more detail during the session. Much of cancer genetic counseling is providing valuable information to individuals who are impacted by genetic information such as this.     If you are interested in scheduling a genetic counseling appointment at [facility], please call [phone] to schedule an appointment. You can also find a genetic counselor close to you or at another health system at [facility]    Sincerely,     Brittney Vera MS, [facility]  Licensed, Certified Genetic Counselor  [facility]

## 2023-02-08 NOTE — PATIENT INSTRUCTIONS
2/8/2023    Dear Relative,     The purpose of this letter is to inform you that your relative recently underwent genetic counseling and genetic testing due to the persona/family history of cancer. The testing done through [facility] identified a mutation in the HOXB13 gene. Specifically, the mutation is called c.251G>A (p.Ffm42Tpt). The accession number linked to your relative's test report is [id number].    A mutation (or change in the genetic code) causes a specific gene to stop working properly. Ultimately, men who have a mutation in this HOXB13 gene have an increased risk for prostate cancer.  The medical literature reports the risk of prostate cancer with this mutation, p.Csn70Bdz, to be as high as 3-4 times population risk.    Currently, there are no published prostate cancer screening guidelines for men with this mutation. The American Cancer Society currently recommends that individuals at high risk for prostate cancer discuss prostate cancer screening with a physician. Since the risk of prostate cancer is increased with a HOXB13 mutation, screening could be considered as early as age 40. This may vary based on family history.    It is important to note that both men and women have a 50% chance of passing this mutation on to each of their children.  Although cancer risks for women have not been described at this time for the HOXB13 gene, genetic testing for this mutation women could help clarify risk for their sons.    I understand that this may be surprising, unexpected, and even scary news. Because this mutation has been identified in your family, you are at risk for having the same mutation. As mentioned earlier, your children may also be at risk for carrying this familial mutation.     Scheduling a genetic counseling appointment does not mean you have to undergo genetic testing. The decision to pursue such testing is a very personal one that is discussed in more detail during the session. Much of cancer  genetic counseling is providing valuable information to individuals who are impacted by genetic information such as this.     If you are interested in scheduling a genetic counseling appointment at [facility], please call [phone] to schedule an appointment. You can also find a genetic counselor close to you or at another health system at [facility]    Sincerely,     Brittney Vera MS, [facility]  Licensed, Certified Genetic Counselor  [facility]

## 2023-02-08 NOTE — Clinical Note
Please send copy of letter and include copy of scanned in test report from [facility] Order [id number] to patients home address.     Brittney Vera MS, [facility]  Licensed, Certified Genetic Counselor  [facility]  [phone]  [email or web address]

## 2023-02-08 NOTE — NURSING NOTE
Is the patient currently in the state of [place]? YES    Visit mode:VIDEO    If the visit is dropped, the patient can be reconnected by: VIDEO VISIT: Text to cell phone: [phone]    Will anyone else be joining the visit? NO      How would you like to obtain your AVS? MyChart    Are changes needed to the allergy or medication list? NO    Comments or concerns related to today's visit: ADRIANNE Azevedo VF

## 2023-02-08 NOTE — LETTER
2/8/2023         RE: Gualberto Lehman  [address]  [place] [place] [facility] [place] [address]        Dear Colleague,    Thank you for referring your patient, Gualberto Lehman, to the [facility]. Please see a copy of my visit note below.    Video-Visit Details    Type of service:  Video Visit  Video Start Time (time video started): 8:00am  Video End Time (time video stopped): 8:15am  Originating Location (pt. Location): Home  Distant Location (provider location):  Off-site  Mode of Communication:  Video Conference via [employer]    2/8/2023    Referring Provider: Dr. Mcdonnell    Presenting Information:   I met with Gualberto via video visit today to discuss his genetic testing results. His blood was drawn on 1/20/2023 and we ordered Common Hereditary Cancers panel + Renal/Urinary Tract Cancers panel testing from [facility]. This testing was done because of his personal and family history of kidney cancer.     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 Specifically his mutation is called c.251G>A (also known as Zfc14Beq). We discussed that this mutation is associated with an increased risk for prostate cancer. We discussed the impact of this testing on Gualberto in detail.     Of note, a variant of uncertain significance (VUS) was also identified in the BARD1 gene called c.1672T>C (p.Ggr442Bdd). This VUS is explained in further detail at the bottom of this letter.     Of note, Gualberto is negative for pathogenic (harmful) mutations in the APC, REHAN, AXIN2, BARD1, BAP1, BMPR1A, BRCA1, BRCA2, BRIP1, CDC73, CDH1, CDK4, CDKN1C, CDKN2A, CHEK2, CTNNA1, DICER1, DIS3L2, EPCAM, FH, FLCN, GREM1, GPC3, HOXB13, KIT, MEN1, MET, MITF, MLH1, MSH2, MSH3, MSH6, MUTYH, NBN, NF1, NTHL1, PALB2, PDGFRA, PMS2, POLD1, POLE, PTEN, RAD50, RAD51C, RAD51D, REST, SDHA, SDHB, SDHC, SDHD, SMAD4, SMARCA4, SMARCB1, STK11, TP53,TSC1, TSC2, VHL, WT1 genes. No pathogenic  "(harmful)  mutations were identified in any of the remaining 58 genes Please see copy of scanned test report for complete details regarding testing methodology/limitations.    A copy of the test report can be found in the Laboratory tab, dated 1/20/2023, and named \"LABORATORY MISCELLANEOUS ORDER\". The report is scanned in as a linked document.    Cancer Risks:  We reviewed that mutations in the HOXB13 gene are associated with increased prostate cancer risk. The p.G84E mutation in HOXB13 has been reported as a  mutation in [place] and other [place] countries (Iggy TABOR et al, Eur Urol, 2014, 65:169-176).   ? Studies suggest the risk of prostate cancer with this particular mutation, p.G84E, to be as high as 30-60% (approximately 3-4 times the general population risk).    ? Of note, a recent study by Lizbeth T et al (Eur Urol, 2018) suggests that the lifetime prostate cancer risk may be influenced by the family history. This means individuals with a significant family history of prostate cancer may have a higher lifetime prostate cancer risk as compared to those with no family history of prostate cancer. Per this study, the average lifetime prostate cancer risk is estimated to be 62% for men with a HOXB13 mutation and no significant family history of prostate cancer. More studies are needed, though, to clarify this risk.  ? Other cancer risks associated with a HOXB13 mutation, particularly female cancer risks, are unknown at this time.      Cancer Screening and Prevention:  There are currently no published prostate cancer screening guidelines specific to men who carry a HOXB13 mutation.   ? The American Cancer Society currently recommends that individuals at high risk for prostate cancer discuss prostate cancer screening with a physician. Since the risk of prostate cancer is increased with a HOXB13 mutation, screening could be considered as early as age 40. This may vary based on family history, " though.  ? Screening typically involves a PSA blood test and can also include a digital rectal exam (DUDLEY). Final screening recommendations should be made by each individual's physician.    Other screening based on Gualberto's personal and family history:    Due to Gualberto's personal history of renal cancer, other close relatives remain at slightly increased risk for renal cancer. While several screening modalities for renal cancer exist (e.g., urinalysis, ultrasound, etc), clear screening recommendations/strategies do not exist for individuals with family history of renal cancer. That being said, Gualberto's relatives should discuss this family history, signs and symptoms of renal cancer, and possible screening options with their providers, as they may have individualized recommendations.    Other population cancer screening options, such as those recommended by the American Cancer Society and the National Comprehensive Cancer Network (NCCN), are also appropriate for Gualberto and his family. These screening recommendations may change if there are changes to Gualberto's personal and/or family history. Final screening recommendations should be made by each individual's managing physician.     We discussed that Gualberto could participate in our Cancer Risk Management Program in which our nursing specialist provides an individual screening plan and assists with medical management. A referral was placed to see LUKE Cisse for this service.    Of note, the above information is based on our current understanding of Gualberto's genetic findings. Gualberto is encouraged to reach out to me regularly regarding any pertinent updates to his personal and/or family history of cancer, as our understanding of the genetic findings in his family may change over time.     Implications for Family Members:  We reviewed that mutations in the HOXB13 gene are inherited in an autosomal dominant pattern. This means that each of Gualberto's children have a 50%  chance of inheriting the same mutation. Likewise, each of his siblings has a 50% risk of having the same mutation. Extended relatives may also carry this mutation, and he is encouraged to share this information with his family members on both sides of the family. I am happy to help his relatives connect with a genetic counselor in their area if they would like to discuss testing.    Additional Genetic Testing Result: Variant of Uncertain Significance (VUS)  Gualberto was also found to have a variant of uncertain significance (VUS) in the BARD1 gene. This variant is called c.1672T>C (p.Nkz996Uoy).  No other variants or mutations were found in the BARD1 gene. Given the uncertain significance of this result, medical management decisions should NOT be made based on this test result alone.    VUS Interpretation:  We discussed several different interpretations of this inconclusive test result. It is not clear if this variant in the BARD1 gene is associated with increased cancer risk.  1. This variant may be a benign change that does not increase cancer risk.  2. This variant may be a harmful mutation that causes increased risk for breast cancer.    BARD1 c.1672T>C (p.Opo816Kao)   Harmful mutations in the BARD1 gene are associated with increased risk for female breast cancer. We discussed that medical management decisions are NOT recommended for individuals with a VUS result.    The laboratory is working to determine if this variant is harmful or benign, and they will contact me if it is reclassified.     We discussed that the increased risk allele in the HOXB13 gene and this BARD1 variant of uncertain significance do not explain Gualberto's personal/family history of cancer. One explanation for Gualberto's unexplained personal/family history of cancer may be that there is a different gene or combination of genes and environment that are associated with the cancers in this family. It is also important to consider that other relatives  "with cancer history may have had a mutation in one of the genes tested, and he did not inherit it.  There is also a small possibility that there is a mutation in one of these genes, and we could not find it with our current testing methods.       Gualberto's relatives may also carry this VUS. Because it is unclear what, if any, risk is associated with this variant, clinical genetic testing for this BARD1 variant alone is not recommended for relatives.      Plan:  1.  A copy of his results will be released to his email from [employer], and a copy will be sent in the mail per Gualberto's request.  2.  I will provide a \"Dear Relative\" letter for Gualberto to share with his family members.  3.  He plans to follow up with his primary care providers and urology teams for ongoing care and cancer screening.  4. I will attempt to contact Gualberto if the laboratory informs me that this VUS has been reclassified.  This may change screening and testing recommendations for Gualberto and his relatives.    Brittney Vera MS, [facility]  Licensed, Certified Genetic Counselor  [facility]  [email or web address]        "

## 2023-03-08 ENCOUNTER — LAB (OUTPATIENT)
Dept: LAB | Facility: CLINIC | Age: 43
End: 2023-03-08
Payer: COMMERCIAL

## 2023-03-08 DIAGNOSIS — C64.1 RENAL CELL CARCINOMA, RIGHT (H): ICD-10-CM

## 2023-03-08 LAB
ANION GAP SERPL CALCULATED.3IONS-SCNC: 9 MMOL/L (ref 7–15)
BUN SERPL-MCNC: 16.8 MG/DL (ref 6–20)
CALCIUM SERPL-MCNC: 9.7 MG/DL (ref 8.6–10)
CHLORIDE SERPL-SCNC: 102 MMOL/L (ref 98–107)
CREAT SERPL-MCNC: 1.24 MG/DL (ref 0.67–1.17)
DEPRECATED HCO3 PLAS-SCNC: 28 MMOL/L (ref 22–29)
GFR SERPL CREATININE-BSD FRML MDRD: 74 ML/MIN/1.73M2
GLUCOSE SERPL-MCNC: 95 MG/DL (ref 70–99)
POTASSIUM SERPL-SCNC: 4.1 MMOL/L (ref 3.4–5.3)
SODIUM SERPL-SCNC: 139 MMOL/L (ref 136–145)

## 2023-03-08 PROCEDURE — 36415 COLL VENOUS BLD VENIPUNCTURE: CPT | Performed by: PATHOLOGY

## 2023-03-08 PROCEDURE — 80048 BASIC METABOLIC PNL TOTAL CA: CPT | Performed by: PATHOLOGY

## 2023-03-13 ENCOUNTER — ANCILLARY PROCEDURE (OUTPATIENT)
Dept: CT IMAGING | Facility: CLINIC | Age: 43
End: 2023-03-13
Attending: UROLOGY
Payer: COMMERCIAL

## 2023-03-13 DIAGNOSIS — C64.1 RENAL CELL CARCINOMA, RIGHT (H): ICD-10-CM

## 2023-03-13 PROCEDURE — 74178 CT ABD&PLV WO CNTR FLWD CNTR: CPT | Performed by: RADIOLOGY

## 2023-03-13 RX ORDER — IOPAMIDOL 755 MG/ML
125 INJECTION, SOLUTION INTRAVASCULAR ONCE
Status: COMPLETED | OUTPATIENT
Start: 2023-03-13 | End: 2023-03-13

## 2023-03-13 RX ADMIN — IOPAMIDOL 125 ML: 755 INJECTION, SOLUTION INTRAVASCULAR at 07:21

## 2023-03-14 ENCOUNTER — PRE VISIT (OUTPATIENT)
Dept: ONCOLOGY | Facility: CLINIC | Age: 43
End: 2023-03-14
Payer: COMMERCIAL

## 2023-03-14 NOTE — TELEPHONE ENCOUNTER
Reason for Visit: Follow up imaging and lab review    Diagnosis: RCC    Orders/Procedures/Records: in system    Contact Patient: n/a    Rooming Requirements: normal      Ivy Munoz LPN  03/14/23  10:41 AM

## 2023-03-21 ENCOUNTER — VIRTUAL VISIT (OUTPATIENT)
Dept: UROLOGY | Facility: CLINIC | Age: 43
End: 2023-03-21
Payer: COMMERCIAL

## 2023-03-21 DIAGNOSIS — C64.1 RENAL CELL CARCINOMA, RIGHT (H): Primary | ICD-10-CM

## 2023-03-21 DIAGNOSIS — Z12.5 SCREENING PSA (PROSTATE SPECIFIC ANTIGEN): ICD-10-CM

## 2023-03-21 PROCEDURE — 99213 OFFICE O/P EST LOW 20 MIN: CPT | Mod: VID | Performed by: UROLOGY

## 2023-03-21 RX ORDER — PANTOPRAZOLE SODIUM 40 MG/1
TABLET, DELAYED RELEASE ORAL
COMMUNITY
Start: 2023-02-23

## 2023-03-21 NOTE — PROGRESS NOTES
Gualberto Lehman is a 42 year old male who is being evaluated via a billable video visit.      How would you like to obtain your AVS? MyChart  If the video visit is dropped, the invitation should be resent by: Text to cell phone: [phone]  Will anyone else be joining your video visit? No    Video Start Time: 11:35 AM    CHIEF COMPLAINT   It was my pleasure to see Gualberto Lehman who is a 42 year old male for follow-up of Renal Cell Carcinoma.      HPI  Gualberto Lehman is a very pleasant 42 year old male who presents with a history of renal cell carcinoma. Right robotic partial nephrectomy completed 11/21/2022. He has recovered without complication. Has seen genetic counseling. He was informed of a mild increased risk of prostate cancer. He otherwise is doing very well with recovery.    Creat 1.24    CT renal 3/13/2023  IMPRESSION:  1.  Postop change partial nephrectomy upper pole right kidney posteriorly with no evidence of residual or recurrent tumor.  2.  Right spermatic cord resection and orchiectomy.  3.  No evidence of metastatic disease in the abdomen or pelvis.     Right Robotic Partial Nephrectomy 11/21/2022          SPECIMEN   Procedure   Partial nephrectomy    Specimen Laterality   Right    TUMOR   Tumor Focality   Unifocal    Tumor Size   Greatest Dimension (Centimeters): 1.9 cm   Histologic Type   Clear cell renal cell carcinoma    Histologic Grade (WHO / ISUP)   G2 (nucleoli conspicuous and eosinophilic at 400x magnification, visible but not prominent at 100x magnification)    Tumor Extent   Limited to kidney    Sarcomatoid Features   Not identified    Rhabdoid Features   Not identified    Tumor Necrosis   Not identified    Lymphovascular Invasion   Not identified    MARGINS   Margin Status   All margins negative for invasive carcinoma    REGIONAL LYMPH NODES   Regional Lymph Node Status   Not applicable (no regional lymph nodes submitted or found)    PATHOLOGIC STAGE CLASSIFICATION (pTNM, AJCC 8th  Edition)   Reporting of pT, pN, and (when applicable) pM categories is based on information available to the pathologist at the time the report is issued. As per the AJCC (Chapter 1, 8th Ed.) it is the managing physician s responsibility to establish the final pathologic stage based upon all pertinent information, including but potentially not limited to this pathology report.   Primary Tumor (pT)   pT1a    Regional Lymph Nodes (pN)   pN not assigned (no nodes submitted or found)    ADDITIONAL FINDINGS   Additional Findings in Nonneoplastic Kidney   None identified           Allergies:   Seasonal allergies         Review of Systems:  From intake questionnaire     Skin: negative  Eyes: negative  Ears/Nose/Throat: negative  Respiratory: No shortness of breath, dyspnea on exertion, cough, or hemoptysis  Cardiovascular: No chest pain or palpitations  Gastrointestinal: negative; no nausea/vomiting, constipation or diarrhea  Genitourinary: as per HPI  Musculoskeletal: negative  Neurologic: negative  Psychiatric: negative  Hematologic/Lymphatic/Immunologic: negative  Endocrine: negative         Physical Exam:     Vitals:  No vitals were obtained today due to virtual visit.    Physical Exam   GENERAL: Healthy, alert and no distress  EYES: Eyes grossly normal to inspection.  No discharge or erythema, or obvious scleral/conjunctival abnormalities.  RESP: No audible wheeze, cough, or visible cyanosis.  No visible retractions or increased work of breathing.    SKIN: Visible skin clear. No significant rash, abnormal pigmentation or lesions.  NEURO: Cranial nerves grossly intact.  Mentation and speech appropriate for age.  PSYCH: Mentation appears normal, affect normal/bright, judgement and insight intact, normal speech and appearance well-groomed.      Outside and Past Medical records:    Review of the result(s) of each unique test - creat, CT         Assessment and Plan:     42 year old male stage pT1a RCC, s/p right robotic  partial nephrectomy completed 11/21/2022. We reviewed pathology today and rationale for ongoing surveillance. No recurrence on CT. We discussed his genetic screening suggested an increased risk of prostate cancer. As such, will draw PSA for baseline screening, and plan to follow this annually pending results.     Plan:  PSA now and again in 12 months  Follow-up 12 months with CT and BMP    Orders  Orders Placed This Encounter   Procedures     CT Renal Mass wo & w Contrast     Basic metabolic panel     PSA tumor marker     PSA tumor marker     Video-Visit Details    Type of service:  Video Visit    Video End Time:11:45 AM    Originating Location (pt. Location): Home    Distant Location (provider location):  On-site    Platform used for Video Visit: [employer]    13 minutes spent on the date of the encounter including direct interaction with the patient, performing chart review, history and exam, documentation and further activities as noted above.    Carlos Mcdonnell MD  Urology  [facility]

## 2023-03-21 NOTE — NURSING NOTE
Is the patient currently in the state of [place]? YES    Visit mode:VIDEO    If the visit is dropped, the patient can be reconnected by: VIDEO VISIT: Text to cell phone: [phone]    Will anyone else be joining the visit? NO      How would you like to obtain your AVS? MyChart    Are changes needed to the allergy or medication list? YES: Please remove meds flagged for removal.    Reason for visit:  Chief Complaint   Patient presents with     Video Visit     RCC follow up     Annel Cavanaugh

## 2023-09-10 ENCOUNTER — HEALTH MAINTENANCE LETTER (OUTPATIENT)
Age: 43
End: 2023-09-10

## 2024-01-12 ENCOUNTER — PATIENT OUTREACH (OUTPATIENT)
Dept: UROLOGY | Facility: CLINIC | Age: 44
End: 2024-01-12
Payer: COMMERCIAL

## 2024-01-12 NOTE — TELEPHONE ENCOUNTER
Writer reached out to pt to inquire more about urinary symptoms.     No answer, writer left a generic VM with call back name and number.     Will continue to follow as needed.

## 2024-02-27 ENCOUNTER — LAB (OUTPATIENT)
Dept: LAB | Facility: CLINIC | Age: 44
End: 2024-02-27
Payer: COMMERCIAL

## 2024-02-27 DIAGNOSIS — Z12.5 SCREENING PSA (PROSTATE SPECIFIC ANTIGEN): ICD-10-CM

## 2024-02-27 DIAGNOSIS — C64.1 RENAL CELL CARCINOMA, RIGHT (H): ICD-10-CM

## 2024-02-27 PROCEDURE — 84153 ASSAY OF PSA TOTAL: CPT

## 2024-02-27 PROCEDURE — 36415 COLL VENOUS BLD VENIPUNCTURE: CPT

## 2024-02-29 LAB — PSA SERPL DL<=0.01 NG/ML-MCNC: 0.44 NG/ML (ref 0–2.5)

## 2024-03-05 ENCOUNTER — ANCILLARY PROCEDURE (OUTPATIENT)
Dept: CT IMAGING | Facility: CLINIC | Age: 44
End: 2024-03-05
Attending: UROLOGY
Payer: COMMERCIAL

## 2024-03-05 DIAGNOSIS — C64.1 RENAL CELL CARCINOMA, RIGHT (H): ICD-10-CM

## 2024-03-05 LAB
CREAT BLD-MCNC: 1.3 MG/DL (ref 0.7–1.3)
EGFRCR SERPLBLD CKD-EPI 2021: >60 ML/MIN/1.73M2

## 2024-03-05 PROCEDURE — 82565 ASSAY OF CREATININE: CPT | Performed by: PATHOLOGY

## 2024-03-05 PROCEDURE — 74178 CT ABD&PLV WO CNTR FLWD CNTR: CPT | Performed by: RADIOLOGY

## 2024-03-05 RX ORDER — IOPAMIDOL 755 MG/ML
111 INJECTION, SOLUTION INTRAVASCULAR ONCE
Status: COMPLETED | OUTPATIENT
Start: 2024-03-05 | End: 2024-03-05

## 2024-03-05 RX ADMIN — IOPAMIDOL 111 ML: 755 INJECTION, SOLUTION INTRAVASCULAR at 16:04

## 2024-03-05 NOTE — DISCHARGE INSTRUCTIONS

## 2024-03-08 ENCOUNTER — PATIENT OUTREACH (OUTPATIENT)
Dept: ONCOLOGY | Facility: CLINIC | Age: 44
End: 2024-03-08
Payer: COMMERCIAL

## 2024-03-08 NOTE — PROGRESS NOTES
Writer spoke to patient informed patient of CT results per provider, patient has follow up appt with [facility], has no questions at this time, will reach out with any further needs.     Thank you,  VIRGIE BhattiN RN-Triage-Urology

## 2025-02-02 ENCOUNTER — HEALTH MAINTENANCE LETTER (OUTPATIENT)
Age: 45
End: 2025-02-02

## 2025-02-05 DIAGNOSIS — C64.1 RENAL CELL CARCINOMA, RIGHT (H): Primary | ICD-10-CM

## (undated) DEVICE — SU MONOCRYL 4-0 PS-2 27" UND Y426H

## (undated) DEVICE — ADH SKIN CLOSURE PREMIERPRO EXOFIN 1.0ML 3470

## (undated) DEVICE — DAVINCI XI GRASPER PROGRASP 8MM EXT 471093

## (undated) DEVICE — PROTECTOR ARM ONE-STEP TRENDELENBURG 40418

## (undated) DEVICE — ENDO TROCAR FIRST ENTRY KII FIOS Z-THRD 05X100MM CTF03

## (undated) DEVICE — SYSTEM LAPAROVUE VISIBILITY LAPVUE10

## (undated) DEVICE — SURGICEL HEMOSTAT 4X8" 1952

## (undated) DEVICE — SUCTION MANIFOLD NEPTUNE 2 SYS 4 PORT 0702-020-000

## (undated) DEVICE — SU VICRYL 0 UR-6 27" J603H

## (undated) DEVICE — DRAIN JACKSON PRATT ROUND W/TROCAR 19FR JP-HUR195

## (undated) DEVICE — DAVINCI XI MONOPOLAR SCISSORS HOT SHEARS 8MM 470179

## (undated) DEVICE — WIPES FOLEY CARE SURESTEP PROVON DFC100

## (undated) DEVICE — DAVINCI XI DRIVER NDL LARGE 8MM EXT 471006

## (undated) DEVICE — SU VICRYL 0 CT-1 36" J346H

## (undated) DEVICE — GLOVE PROTEXIS W/NEU-THERA 7.5  2D73TE75

## (undated) DEVICE — DAVINCI XI DRAPE COLUMN 470341

## (undated) DEVICE — LINEN TOWEL PACK X6 WHITE 5487

## (undated) DEVICE — SOL WATER IRRIG 1000ML BOTTLE 2F7114

## (undated) DEVICE — LINEN TOWEL PACK X30 5481

## (undated) DEVICE — DRAPE IOBAN INCISE 13X13" 6640EZ

## (undated) DEVICE — SU PROLENE 5-0 RB-1DA 36"  8556H

## (undated) DEVICE — BLADE CLIPPER SGL USE 9680

## (undated) DEVICE — DAVINCI HOT SHEARS TIP COVER  400180

## (undated) DEVICE — DRAPE MAYO STAND 23X54 8337

## (undated) DEVICE — SU VICRYL 3-0 SH 27" UND J416H

## (undated) DEVICE — PACK DAVINCI UROL

## (undated) DEVICE — DAVINCI XI DRAPE ARM 470015

## (undated) DEVICE — DRAPE U SPLIT 74X120" 29440

## (undated) DEVICE — PACK GOWN 3/PK DISP XL SBA32GPFCB

## (undated) DEVICE — PREP CHLORAPREP 26ML TINTED HI-LITE ORANGE 930815

## (undated) DEVICE — CLIP ENDO HEMO-LOC PURPLE LG 544240

## (undated) DEVICE — RX SURGIFLO HEMOSTATIC MATRIX 8ML 2991

## (undated) DEVICE — SU ETHILON 3-0 PS-1 18" 1663H

## (undated) DEVICE — DAVINCI XI SEAL UNIVERSAL 5-8MM 470361

## (undated) DEVICE — ENDO TROCAR CONMED AIRSEAL BLADELESS 12X120MM IAS12-120LP

## (undated) DEVICE — ENDO TROCAR SLEEVE KII Z-THREADED 12X100MM CTS22

## (undated) DEVICE — VESSEL LOOPS YELLOW MAXI 31145694

## (undated) DEVICE — DRSG PRIMAPORE 02X3" 7133

## (undated) DEVICE — SU STRATAFIX PDS PLUS 3-0 SPIRAL SH 15CM SXPP1B420

## (undated) DEVICE — NDL INSUFFLATION 13GA 120MM C2201

## (undated) DEVICE — DRAIN JACKSON PRATT RESERVOIR 100ML SU130-1305

## (undated) DEVICE — ENDO POUCH UNIV RETRIEVAL SYSTEM INZII 10MM CD001

## (undated) RX ORDER — SCOLOPAMINE TRANSDERMAL SYSTEM 1 MG/1
PATCH, EXTENDED RELEASE TRANSDERMAL
Status: DISPENSED
Start: 2022-11-21

## (undated) RX ORDER — ONDANSETRON 2 MG/ML
INJECTION INTRAMUSCULAR; INTRAVENOUS
Status: DISPENSED
Start: 2022-11-21

## (undated) RX ORDER — HYDROMORPHONE HYDROCHLORIDE 1 MG/ML
INJECTION, SOLUTION INTRAMUSCULAR; INTRAVENOUS; SUBCUTANEOUS
Status: DISPENSED
Start: 2022-11-21

## (undated) RX ORDER — CEFAZOLIN SODIUM/WATER 2 G/20 ML
SYRINGE (ML) INTRAVENOUS
Status: DISPENSED
Start: 2022-11-21

## (undated) RX ORDER — BUPIVACAINE HYDROCHLORIDE 2.5 MG/ML
INJECTION, SOLUTION EPIDURAL; INFILTRATION; INTRACAUDAL
Status: DISPENSED
Start: 2022-11-21

## (undated) RX ORDER — SODIUM CHLORIDE 9 MG/ML
INJECTION, SOLUTION INTRAVENOUS
Status: DISPENSED
Start: 2022-11-21

## (undated) RX ORDER — FENTANYL CITRATE 50 UG/ML
INJECTION, SOLUTION INTRAMUSCULAR; INTRAVENOUS
Status: DISPENSED
Start: 2022-11-21

## (undated) RX ORDER — ACETAMINOPHEN 325 MG/1
TABLET ORAL
Status: DISPENSED
Start: 2022-11-21

## (undated) RX ORDER — HYDROMORPHONE HCL IN WATER/PF 6 MG/30 ML
PATIENT CONTROLLED ANALGESIA SYRINGE INTRAVENOUS
Status: DISPENSED
Start: 2022-11-21

## (undated) RX ORDER — LABETALOL HYDROCHLORIDE 5 MG/ML
INJECTION, SOLUTION INTRAVENOUS
Status: DISPENSED
Start: 2022-11-21

## (undated) RX ORDER — FENTANYL CITRATE-0.9 % NACL/PF 10 MCG/ML
PLASTIC BAG, INJECTION (ML) INTRAVENOUS
Status: DISPENSED
Start: 2022-11-21